# Patient Record
Sex: MALE | Race: WHITE | Employment: FULL TIME | URBAN - NONMETROPOLITAN AREA
[De-identification: names, ages, dates, MRNs, and addresses within clinical notes are randomized per-mention and may not be internally consistent; named-entity substitution may affect disease eponyms.]

---

## 2017-02-13 ENCOUNTER — OFFICE VISIT (OUTPATIENT)
Dept: PRIMARY CARE CLINIC | Age: 32
End: 2017-02-13
Payer: MEDICAID

## 2017-02-13 VITALS
OXYGEN SATURATION: 99 % | SYSTOLIC BLOOD PRESSURE: 118 MMHG | DIASTOLIC BLOOD PRESSURE: 70 MMHG | HEART RATE: 85 BPM | WEIGHT: 149 LBS | BODY MASS INDEX: 22.58 KG/M2 | RESPIRATION RATE: 18 BRPM | HEIGHT: 68 IN

## 2017-02-13 DIAGNOSIS — L91.8 SKIN TAG: ICD-10-CM

## 2017-02-13 DIAGNOSIS — R23.8 SKIN IRRITATION: ICD-10-CM

## 2017-02-13 DIAGNOSIS — K64.4 INTERNAL AND EXTERNAL BLEEDING HEMORRHOIDS: ICD-10-CM

## 2017-02-13 DIAGNOSIS — Z72.0 TOBACCO ABUSE: ICD-10-CM

## 2017-02-13 DIAGNOSIS — F90.2 ATTENTION DEFICIT HYPERACTIVITY DISORDER (ADHD), COMBINED TYPE: Primary | ICD-10-CM

## 2017-02-13 DIAGNOSIS — K64.8 INTERNAL AND EXTERNAL BLEEDING HEMORRHOIDS: ICD-10-CM

## 2017-02-13 LAB
AMPHETAMINE SCREEN, URINE: NORMAL
BARBITURATE SCREEN, URINE: NORMAL
BENZODIAZEPINE SCREEN, URINE: NORMAL
COCAINE METABOLITE SCREEN URINE: NORMAL
MDMA URINE: NORMAL
METHADONE SCREEN, URINE: NORMAL
METHAMPHETAMINE, URINE: NORMAL
OPIATE SCREEN URINE: NORMAL
OXYCODONE SCREEN URINE: NORMAL
PHENCYCLIDINE SCREEN URINE: NORMAL
PROPOXYPHENE SCREEN, URINE: NORMAL
THC: NORMAL
TRICYCLIC ANTIDEPRESSANTS, UR: NORMAL

## 2017-02-13 PROCEDURE — 99406 BEHAV CHNG SMOKING 3-10 MIN: CPT | Performed by: FAMILY MEDICINE

## 2017-02-13 PROCEDURE — 80305 DRUG TEST PRSMV DIR OPT OBS: CPT | Performed by: FAMILY MEDICINE

## 2017-02-13 PROCEDURE — 11200 RMVL SKIN TAGS UP TO&INC 15: CPT | Performed by: FAMILY MEDICINE

## 2017-02-13 PROCEDURE — 99213 OFFICE O/P EST LOW 20 MIN: CPT | Performed by: FAMILY MEDICINE

## 2017-02-13 RX ORDER — FEXOFENADINE HCL 180 MG/1
180 TABLET ORAL DAILY
Qty: 30 TABLET | Refills: 5 | Status: SHIPPED | OUTPATIENT
Start: 2017-02-13 | End: 2017-03-16 | Stop reason: SDUPTHER

## 2017-02-13 ASSESSMENT — ENCOUNTER SYMPTOMS
WHEEZING: 0
DIARRHEA: 0
CONSTIPATION: 0
SHORTNESS OF BREATH: 0
ABDOMINAL PAIN: 0
NAUSEA: 0
CHEST TIGHTNESS: 0
COUGH: 0
VOMITING: 0

## 2017-03-16 RX ORDER — FEXOFENADINE HCL 180 MG/1
180 TABLET ORAL DAILY
Qty: 30 TABLET | Refills: 11 | Status: SHIPPED | OUTPATIENT
Start: 2017-03-16 | End: 2018-04-12 | Stop reason: SDUPTHER

## 2017-03-16 RX ORDER — NAPROXEN 500 MG/1
500 TABLET ORAL 2 TIMES DAILY WITH MEALS
Qty: 60 TABLET | Refills: 11 | Status: SHIPPED | OUTPATIENT
Start: 2017-03-16 | End: 2018-05-08 | Stop reason: SDUPTHER

## 2017-03-16 RX ORDER — FLUTICASONE PROPIONATE 50 MCG
1 SPRAY, SUSPENSION (ML) NASAL DAILY
Qty: 1 BOTTLE | Refills: 11 | Status: SHIPPED | OUTPATIENT
Start: 2017-03-16 | End: 2018-05-08 | Stop reason: SDUPTHER

## 2017-05-15 ENCOUNTER — OFFICE VISIT (OUTPATIENT)
Dept: PRIMARY CARE CLINIC | Age: 32
End: 2017-05-15
Payer: MEDICAID

## 2017-05-15 VITALS
WEIGHT: 151 LBS | DIASTOLIC BLOOD PRESSURE: 80 MMHG | BODY MASS INDEX: 22.88 KG/M2 | TEMPERATURE: 97.2 F | OXYGEN SATURATION: 99 % | RESPIRATION RATE: 18 BRPM | HEIGHT: 68 IN | HEART RATE: 88 BPM | SYSTOLIC BLOOD PRESSURE: 122 MMHG

## 2017-05-15 DIAGNOSIS — Z72.0 TOBACCO ABUSE: ICD-10-CM

## 2017-05-15 DIAGNOSIS — F98.8 ATTENTION DEFICIT DISORDER: Primary | ICD-10-CM

## 2017-05-15 PROBLEM — F90.2 ATTENTION DEFICIT HYPERACTIVITY DISORDER (ADHD), COMBINED TYPE: Status: RESOLVED | Noted: 2017-02-13 | Resolved: 2017-05-15

## 2017-05-15 LAB
AMPHETAMINE SCREEN, URINE: POSITIVE
BARBITURATE SCREEN, URINE: NEGATIVE
BENZODIAZEPINE SCREEN, URINE: NEGATIVE
COCAINE METABOLITE SCREEN URINE: NEGATIVE
MDMA URINE: NEGATIVE
METHADONE SCREEN, URINE: NEGATIVE
METHAMPHETAMINE, URINE: NEGATIVE
OPIATE SCREEN URINE: NEGATIVE
OXYCODONE SCREEN URINE: NEGATIVE
PHENCYCLIDINE SCREEN URINE: NEGATIVE
PROPOXYPHENE SCREEN, URINE: NEGATIVE
THC: NEGATIVE
TRICYCLIC ANTIDEPRESSANTS, UR: NEGATIVE

## 2017-05-15 PROCEDURE — 99213 OFFICE O/P EST LOW 20 MIN: CPT | Performed by: FAMILY MEDICINE

## 2017-05-15 PROCEDURE — 80305 DRUG TEST PRSMV DIR OPT OBS: CPT | Performed by: FAMILY MEDICINE

## 2017-05-15 PROCEDURE — 99407 BEHAV CHNG SMOKING > 10 MIN: CPT | Performed by: FAMILY MEDICINE

## 2017-05-15 RX ORDER — NICOTINE 21 MG/24HR
1 PATCH, TRANSDERMAL 24 HOURS TRANSDERMAL EVERY 24 HOURS
Qty: 30 PATCH | Refills: 1 | Status: SHIPPED | OUTPATIENT
Start: 2017-05-15 | End: 2022-09-14

## 2017-05-15 RX ORDER — BUPROPION HYDROCHLORIDE 100 MG/1
100 TABLET, EXTENDED RELEASE ORAL 2 TIMES DAILY
Qty: 60 TABLET | Refills: 3 | Status: SHIPPED | OUTPATIENT
Start: 2017-05-15 | End: 2017-09-26

## 2017-05-18 ASSESSMENT — ENCOUNTER SYMPTOMS
DIARRHEA: 0
NAUSEA: 0
CHEST TIGHTNESS: 0
WHEEZING: 0
VOMITING: 0
COUGH: 0
CONSTIPATION: 0
SHORTNESS OF BREATH: 0
ABDOMINAL PAIN: 0

## 2017-08-15 ENCOUNTER — OFFICE VISIT (OUTPATIENT)
Dept: PRIMARY CARE CLINIC | Age: 32
End: 2017-08-15
Payer: MEDICAID

## 2017-08-15 VITALS
DIASTOLIC BLOOD PRESSURE: 84 MMHG | HEIGHT: 68 IN | WEIGHT: 153.8 LBS | SYSTOLIC BLOOD PRESSURE: 122 MMHG | OXYGEN SATURATION: 98 % | BODY MASS INDEX: 23.31 KG/M2 | TEMPERATURE: 97.8 F | HEART RATE: 89 BPM

## 2017-08-15 DIAGNOSIS — Z72.0 TOBACCO ABUSE: ICD-10-CM

## 2017-08-15 DIAGNOSIS — Z00.00 ROUTINE GENERAL MEDICAL EXAMINATION AT A HEALTH CARE FACILITY: Primary | ICD-10-CM

## 2017-08-15 DIAGNOSIS — F98.8 ATTENTION DEFICIT DISORDER: ICD-10-CM

## 2017-08-15 PROCEDURE — 99407 BEHAV CHNG SMOKING > 10 MIN: CPT | Performed by: FAMILY MEDICINE

## 2017-08-15 PROCEDURE — 99395 PREV VISIT EST AGE 18-39: CPT | Performed by: FAMILY MEDICINE

## 2017-08-15 RX ORDER — VARENICLINE TARTRATE 1 MG/1
1 TABLET, FILM COATED ORAL 2 TIMES DAILY
Qty: 60 TABLET | Refills: 3 | Status: SHIPPED | OUTPATIENT
Start: 2017-08-15 | End: 2017-09-26 | Stop reason: SDUPTHER

## 2017-08-15 RX ORDER — VARENICLINE TARTRATE 25 MG
KIT ORAL
Qty: 1 EACH | Refills: 0 | Status: SHIPPED | OUTPATIENT
Start: 2017-08-15 | End: 2018-12-07

## 2017-08-15 ASSESSMENT — ENCOUNTER SYMPTOMS
COUGH: 0
RHINORRHEA: 0
ABDOMINAL PAIN: 0
EYE ITCHING: 0
NAUSEA: 0
SORE THROAT: 0
COLOR CHANGE: 0
SHORTNESS OF BREATH: 0

## 2017-09-26 ENCOUNTER — OFFICE VISIT (OUTPATIENT)
Dept: PRIMARY CARE CLINIC | Age: 32
End: 2017-09-26
Payer: MEDICAID

## 2017-09-26 VITALS
HEART RATE: 61 BPM | HEIGHT: 68 IN | DIASTOLIC BLOOD PRESSURE: 72 MMHG | RESPIRATION RATE: 20 BRPM | WEIGHT: 150 LBS | SYSTOLIC BLOOD PRESSURE: 110 MMHG | BODY MASS INDEX: 22.73 KG/M2 | TEMPERATURE: 98 F | OXYGEN SATURATION: 98 %

## 2017-09-26 DIAGNOSIS — F98.8 ATTENTION DEFICIT DISORDER: Primary | Chronic | ICD-10-CM

## 2017-09-26 DIAGNOSIS — F43.9 STRESS AT HOME: ICD-10-CM

## 2017-09-26 DIAGNOSIS — Z72.0 TOBACCO ABUSE: Chronic | ICD-10-CM

## 2017-09-26 PROCEDURE — 99213 OFFICE O/P EST LOW 20 MIN: CPT | Performed by: FAMILY MEDICINE

## 2017-09-26 PROCEDURE — 99407 BEHAV CHNG SMOKING > 10 MIN: CPT | Performed by: FAMILY MEDICINE

## 2017-09-26 RX ORDER — VARENICLINE TARTRATE 1 MG/1
1 TABLET, FILM COATED ORAL 2 TIMES DAILY
Qty: 60 TABLET | Refills: 3 | Status: SHIPPED | OUTPATIENT
Start: 2017-09-26 | End: 2018-12-07

## 2017-09-26 ASSESSMENT — ENCOUNTER SYMPTOMS
VOMITING: 0
NAUSEA: 0
DIARRHEA: 0
COUGH: 0
CONSTIPATION: 0
CHEST TIGHTNESS: 0
WHEEZING: 0
SHORTNESS OF BREATH: 0
ABDOMINAL PAIN: 0

## 2017-11-22 NOTE — TELEPHONE ENCOUNTER
From: Andreina Saravia  Sent: 11/21/2017 11:20 PM CST  Subject: Medication Renewal Request    Andreina Saravia would like a refill of the following medications:  lisdexamfetamine (VYVANSE) 50 MG capsule Lois Alfaro MD]    Preferred pharmacy: [facility] [phone]    Comment:  I am taking my last dose of Vyvanse on Wednesday. So if you could please call in a refill so that I can  at [facility] tomorrow. Thanks.

## 2017-11-22 NOTE — TELEPHONE ENCOUNTER
PRITI was reviewed today per office protocol. Report shows No discrepancies. Fill pattern is consistent from single provider(s) at single pharmacy(s).     Presciption Escribed

## 2017-12-15 ENCOUNTER — OFFICE VISIT (OUTPATIENT)
Dept: PRIMARY CARE CLINIC | Age: 32
End: 2017-12-15
Payer: MEDICAID

## 2017-12-15 VITALS
OXYGEN SATURATION: 98 % | TEMPERATURE: 98 F | HEIGHT: 68 IN | BODY MASS INDEX: 21.67 KG/M2 | RESPIRATION RATE: 20 BRPM | SYSTOLIC BLOOD PRESSURE: 122 MMHG | WEIGHT: 143 LBS | HEART RATE: 81 BPM | DIASTOLIC BLOOD PRESSURE: 70 MMHG

## 2017-12-15 DIAGNOSIS — Z71.89 GRIEF COUNSELING: ICD-10-CM

## 2017-12-15 DIAGNOSIS — Z63.9 RELATIONSHIP PROBLEMS: ICD-10-CM

## 2017-12-15 DIAGNOSIS — F32.0 MILD SINGLE CURRENT EPISODE OF MAJOR DEPRESSIVE DISORDER (HCC): Primary | ICD-10-CM

## 2017-12-15 DIAGNOSIS — F98.8 ATTENTION DEFICIT DISORDER (ADD) WITHOUT HYPERACTIVITY: Chronic | ICD-10-CM

## 2017-12-15 PROCEDURE — G8427 DOCREV CUR MEDS BY ELIG CLIN: HCPCS | Performed by: FAMILY MEDICINE

## 2017-12-15 PROCEDURE — G8420 CALC BMI NORM PARAMETERS: HCPCS | Performed by: FAMILY MEDICINE

## 2017-12-15 PROCEDURE — G8484 FLU IMMUNIZE NO ADMIN: HCPCS | Performed by: FAMILY MEDICINE

## 2017-12-15 PROCEDURE — 99213 OFFICE O/P EST LOW 20 MIN: CPT | Performed by: FAMILY MEDICINE

## 2017-12-15 PROCEDURE — 4004F PT TOBACCO SCREEN RCVD TLK: CPT | Performed by: FAMILY MEDICINE

## 2017-12-15 RX ORDER — ESCITALOPRAM OXALATE 10 MG/1
10 TABLET ORAL DAILY
Qty: 30 TABLET | Refills: 3 | Status: SHIPPED | OUTPATIENT
Start: 2017-12-15 | End: 2018-01-19 | Stop reason: SDUPTHER

## 2017-12-15 SDOH — SOCIAL STABILITY - SOCIAL INSECURITY: PROBLEM RELATED TO PRIMARY SUPPORT GROUP, UNSPECIFIED: Z63.9

## 2017-12-15 ASSESSMENT — ENCOUNTER SYMPTOMS
COUGH: 0
SHORTNESS OF BREATH: 0
CHEST TIGHTNESS: 0
ABDOMINAL PAIN: 0
DIARRHEA: 0
WHEEZING: 0
CONSTIPATION: 0
NAUSEA: 0
VOMITING: 0

## 2017-12-15 NOTE — PROGRESS NOTES
no distension and no mass. There is no tenderness. There is no rebound and no guarding. Musculoskeletal:        Right lower leg: He exhibits no edema. Left lower leg: He exhibits no edema. Neurological: He is alert and oriented to person, place, and time. Coordination and gait normal.   Skin: Skin is warm and dry. He is not diaphoretic. No cyanosis. Nails show no clubbing. Psychiatric: His speech is normal and behavior is normal. Judgment normal. His mood appears not anxious. Cognition and memory are normal. He exhibits a depressed mood. He expresses no homicidal and no suicidal ideation. Nursing note and vitals reviewed. Assessment:    ICD-10-CM ICD-9-CM    1. Mild single current episode of major depressive disorder (HCC) F32.0 296.21    2. Relationship problems Z63.9 313.3    3. Attention deficit disorder (ADD) without hyperactivity F98.8 314.00        Plan:   I do believe he has underlying depression. I have recommended therapy individually and then to consider possible couples therapy. Approximately 20 minutes of time was taken in regards to counseling about major life decisions including possibly divorce, starting a new relationship, however handle upcoming holiday season with children, and we discussed him staying on his own versus with his new girlfriend until things settle down. Primary goals to help with any underlying depression further him to have time to process these events before he makes any decisions regarding his marriage versus the new relationship. Continue Vyvanse at this time. No orders of the defined types were placed in this encounter. No orders of the defined types were placed in this encounter. There are no discontinued medications. There are no Patient Instructions on file for this visit. Patient given educational handouts and has had all questions answered. Patient voices understanding and agrees to plans along with risks and benefits of plan.  Patient is instructed to continue prior meds, diet, and exercise plans unless instructed otherwise. Patient agrees to follow up as instructed and sooner if needed. Patient agrees to go to ER if condition becomes emergent. Notes may be completed with dictation device and spelling errors may occur. No Follow-up on file.

## 2018-01-19 RX ORDER — ESCITALOPRAM OXALATE 20 MG/1
20 TABLET ORAL DAILY
Qty: 30 TABLET | Refills: 2 | Status: SHIPPED | OUTPATIENT
Start: 2018-01-19 | End: 2018-04-02 | Stop reason: SDUPTHER

## 2018-01-22 ENCOUNTER — TELEPHONE (OUTPATIENT)
Dept: PRIMARY CARE CLINIC | Age: 33
End: 2018-01-22

## 2018-01-23 ENCOUNTER — OFFICE VISIT (OUTPATIENT)
Dept: PRIMARY CARE CLINIC | Age: 33
End: 2018-01-23
Payer: MEDICAID

## 2018-01-23 VITALS
HEART RATE: 88 BPM | TEMPERATURE: 98 F | HEIGHT: 68 IN | DIASTOLIC BLOOD PRESSURE: 82 MMHG | WEIGHT: 141 LBS | BODY MASS INDEX: 21.37 KG/M2 | OXYGEN SATURATION: 98 % | SYSTOLIC BLOOD PRESSURE: 136 MMHG | RESPIRATION RATE: 20 BRPM

## 2018-01-23 DIAGNOSIS — M75.21 BICEPS TENDINITIS OF RIGHT UPPER EXTREMITY: ICD-10-CM

## 2018-01-23 DIAGNOSIS — R11.0 NAUSEA: ICD-10-CM

## 2018-01-23 DIAGNOSIS — R42 DIZZINESS: ICD-10-CM

## 2018-01-23 DIAGNOSIS — F32.9 REACTIVE DEPRESSION: Primary | ICD-10-CM

## 2018-01-23 DIAGNOSIS — F98.8 ATTENTION DEFICIT DISORDER (ADD) WITHOUT HYPERACTIVITY: Chronic | ICD-10-CM

## 2018-01-23 PROCEDURE — 99214 OFFICE O/P EST MOD 30 MIN: CPT | Performed by: FAMILY MEDICINE

## 2018-01-23 PROCEDURE — G8484 FLU IMMUNIZE NO ADMIN: HCPCS | Performed by: FAMILY MEDICINE

## 2018-01-23 PROCEDURE — 4004F PT TOBACCO SCREEN RCVD TLK: CPT | Performed by: FAMILY MEDICINE

## 2018-01-23 PROCEDURE — G8420 CALC BMI NORM PARAMETERS: HCPCS | Performed by: FAMILY MEDICINE

## 2018-01-23 PROCEDURE — G8427 DOCREV CUR MEDS BY ELIG CLIN: HCPCS | Performed by: FAMILY MEDICINE

## 2018-01-23 RX ORDER — PREDNISONE 20 MG/1
TABLET ORAL
Qty: 20 TABLET | Refills: 0 | Status: SHIPPED | OUTPATIENT
Start: 2018-01-23 | End: 2018-12-07

## 2018-01-23 ASSESSMENT — ENCOUNTER SYMPTOMS
CHEST TIGHTNESS: 0
VOMITING: 0
DIARRHEA: 0
WHEEZING: 0
CONSTIPATION: 0
NAUSEA: 0
COUGH: 0
SHORTNESS OF BREATH: 0
ABDOMINAL PAIN: 0

## 2018-01-23 NOTE — PROGRESS NOTES
Bc Arias is a 33 y.o. male who presents today for   Chief Complaint   Patient presents with    Depression       HPI  Patient is here for f/u depression. He had an affair and is with that individual.  States going well. Going through divorce. He is expecting child. He is currently seeing Tena Gonzales. Counseling improving depressed feelings. He has increased lexapro to 20 mg daily. He has had some nausea and dizziness. It is not debilitating. Needs a refill on his ADD medications today. Also he notes he is having right elbow pain. It was worsened whenever he was working at [employer] in. He notes that whenever he would do a repetitive motion of the cause some increased pain. No change in PMH, family, social, or surgical history unless mentioned above. Review of Systems   Constitutional: Negative for chills and fever. Respiratory: Negative for cough, chest tightness, shortness of breath and wheezing. Cardiovascular: Negative for chest pain, palpitations and leg swelling. Gastrointestinal: Negative for abdominal pain, constipation, diarrhea, nausea and vomiting. Genitourinary: Negative for difficulty urinating, dysuria and frequency. Psychiatric/Behavioral: Positive for dysphoric mood. Negative for self-injury, sleep disturbance and suicidal ideas. The patient is not nervous/anxious. Past Medical History:   Diagnosis Date    Asthma     as a child    Enlarged prostate     Hemorrhoid     Kidney stones     Rectal bleed        Current Outpatient Prescriptions   Medication Sig Dispense Refill    lisdexamfetamine (VYVANSE) 50 MG capsule Take 1 capsule by mouth every morning for 30 days.  30 capsule 0    escitalopram (LEXAPRO) 20 MG tablet Take 1 tablet by mouth daily 30 tablet 2    varenicline (CHANTIX CONTINUING MONTH AARON) 1 MG tablet Take 1 tablet by mouth 2 times daily 60 tablet 3    varenicline (CHANTIX STARTING MONTH AARON) 0.5 MG X 11 & 1 MG X 42 tablet Take days.     Dispense:  30 capsule     Refill:  0     Medications Discontinued During This Encounter   Medication Reason    lisdexamfetamine (VYVANSE) 50 MG capsule Reorder     There are no Patient Instructions on file for this visit. Patient given educational handouts and has had all questions answered. Patient voices understanding and agrees to plans along with risks and benefits of plan. Patient is instructed to continue prior meds, diet, and exercise plans unless instructed otherwise. Patient agrees to follow up as instructed and sooner if needed. Patient agrees to go to ER if condition becomes emergent. Notes may be completed with dictation device and spelling errors may occur. No Follow-up on file.

## 2018-02-22 NOTE — TELEPHONE ENCOUNTER
Tarun Robinson called 02/22/18 with request for refill on Vyvanse 50 mg. Last office visit 01/23/18 with next scheduled appointment 04/23/18  Dose verified.    Last UDS  05/05/17  negative  Last fill per Dr Dev Allen

## 2018-04-02 RX ORDER — ESCITALOPRAM OXALATE 10 MG/1
10 TABLET ORAL DAILY
Qty: 30 TABLET | Refills: 5 | Status: SHIPPED | OUTPATIENT
Start: 2018-04-02 | End: 2019-01-14 | Stop reason: SDUPTHER

## 2018-04-12 RX ORDER — FEXOFENADINE HCL 180 MG/1
180 TABLET ORAL DAILY
Qty: 30 TABLET | Refills: 11 | Status: SHIPPED | OUTPATIENT
Start: 2018-04-12 | End: 2019-04-09 | Stop reason: SDUPTHER

## 2018-05-07 ENCOUNTER — PATIENT MESSAGE (OUTPATIENT)
Dept: PRIMARY CARE CLINIC | Age: 33
End: 2018-05-07

## 2018-05-08 RX ORDER — FLUTICASONE PROPIONATE 50 MCG
1 SPRAY, SUSPENSION (ML) NASAL DAILY
Qty: 1 BOTTLE | Refills: 11 | Status: SHIPPED | OUTPATIENT
Start: 2018-05-08 | End: 2021-03-21 | Stop reason: SDUPTHER

## 2018-05-08 RX ORDER — NAPROXEN 500 MG/1
500 TABLET ORAL 2 TIMES DAILY WITH MEALS
Qty: 60 TABLET | Refills: 11 | Status: SHIPPED | OUTPATIENT
Start: 2018-05-08

## 2018-06-05 ENCOUNTER — OFFICE VISIT (OUTPATIENT)
Dept: PRIMARY CARE CLINIC | Age: 33
End: 2018-06-05
Payer: MEDICAID

## 2018-06-05 VITALS
RESPIRATION RATE: 20 BRPM | WEIGHT: 143 LBS | HEART RATE: 79 BPM | SYSTOLIC BLOOD PRESSURE: 102 MMHG | DIASTOLIC BLOOD PRESSURE: 64 MMHG | OXYGEN SATURATION: 94 % | TEMPERATURE: 98 F | BODY MASS INDEX: 21.67 KG/M2 | HEIGHT: 68 IN

## 2018-06-05 DIAGNOSIS — F32.4 MAJOR DEPRESSIVE DISORDER WITH SINGLE EPISODE, IN PARTIAL REMISSION (HCC): Chronic | ICD-10-CM

## 2018-06-05 DIAGNOSIS — Z23 NEED FOR PROPHYLACTIC VACCINATION AGAINST STREPTOCOCCUS PNEUMONIAE (PNEUMOCOCCUS): ICD-10-CM

## 2018-06-05 DIAGNOSIS — F98.8 ATTENTION DEFICIT DISORDER (ADD) WITHOUT HYPERACTIVITY: Primary | Chronic | ICD-10-CM

## 2018-06-05 DIAGNOSIS — Z72.0 TOBACCO ABUSE: Chronic | ICD-10-CM

## 2018-06-05 PROBLEM — F32.9 REACTIVE DEPRESSION: Chronic | Status: RESOLVED | Noted: 2018-01-23 | Resolved: 2018-06-05

## 2018-06-05 PROCEDURE — 90471 IMMUNIZATION ADMIN: CPT | Performed by: FAMILY MEDICINE

## 2018-06-05 PROCEDURE — G8420 CALC BMI NORM PARAMETERS: HCPCS | Performed by: FAMILY MEDICINE

## 2018-06-05 PROCEDURE — G8427 DOCREV CUR MEDS BY ELIG CLIN: HCPCS | Performed by: FAMILY MEDICINE

## 2018-06-05 PROCEDURE — 90732 PPSV23 VACC 2 YRS+ SUBQ/IM: CPT | Performed by: FAMILY MEDICINE

## 2018-06-05 PROCEDURE — 4004F PT TOBACCO SCREEN RCVD TLK: CPT | Performed by: FAMILY MEDICINE

## 2018-06-05 PROCEDURE — 99406 BEHAV CHNG SMOKING 3-10 MIN: CPT | Performed by: FAMILY MEDICINE

## 2018-06-05 PROCEDURE — 99213 OFFICE O/P EST LOW 20 MIN: CPT | Performed by: FAMILY MEDICINE

## 2018-06-05 ASSESSMENT — ENCOUNTER SYMPTOMS
CHEST TIGHTNESS: 0
DIARRHEA: 0
SHORTNESS OF BREATH: 0
CONSTIPATION: 0
ABDOMINAL PAIN: 0
NAUSEA: 0
COUGH: 0
WHEEZING: 0
VOMITING: 0

## 2018-06-05 ASSESSMENT — PATIENT HEALTH QUESTIONNAIRE - PHQ9
2. FEELING DOWN, DEPRESSED OR HOPELESS: 1
SUM OF ALL RESPONSES TO PHQ QUESTIONS 1-9: 2
1. LITTLE INTEREST OR PLEASURE IN DOING THINGS: 1
SUM OF ALL RESPONSES TO PHQ9 QUESTIONS 1 & 2: 2

## 2018-07-17 DIAGNOSIS — F98.8 ATTENTION DEFICIT DISORDER (ADD) WITHOUT HYPERACTIVITY: Chronic | ICD-10-CM

## 2018-08-20 DIAGNOSIS — F98.8 ATTENTION DEFICIT DISORDER (ADD) WITHOUT HYPERACTIVITY: Chronic | ICD-10-CM

## 2018-08-21 ENCOUNTER — PATIENT MESSAGE (OUTPATIENT)
Dept: PRIMARY CARE CLINIC | Age: 33
End: 2018-08-21

## 2018-08-22 ENCOUNTER — PATIENT MESSAGE (OUTPATIENT)
Dept: PRIMARY CARE CLINIC | Age: 33
End: 2018-08-22

## 2018-08-22 NOTE — TELEPHONE ENCOUNTER
From: Kathya Lockett  To: Nichole Goodwin MD  Sent: 8/21/2018 10:23 PM CDT  Subject: Prescription Question    I sent in for a refill on my Vyvanse Monday afternoon through [address] but haven't gotten a follow up from your office. I would just like to check to make sure that my request is being processed.

## 2018-09-07 ENCOUNTER — OFFICE VISIT (OUTPATIENT)
Dept: PRIMARY CARE CLINIC | Age: 33
End: 2018-09-07
Payer: MEDICAID

## 2018-09-07 VITALS
SYSTOLIC BLOOD PRESSURE: 136 MMHG | TEMPERATURE: 98 F | BODY MASS INDEX: 21.82 KG/M2 | DIASTOLIC BLOOD PRESSURE: 78 MMHG | RESPIRATION RATE: 20 BRPM | HEIGHT: 68 IN | WEIGHT: 144 LBS

## 2018-09-07 DIAGNOSIS — F98.8 ATTENTION DEFICIT DISORDER (ADD) WITHOUT HYPERACTIVITY: Primary | Chronic | ICD-10-CM

## 2018-09-07 DIAGNOSIS — F32.4 MAJOR DEPRESSIVE DISORDER WITH SINGLE EPISODE, IN PARTIAL REMISSION (HCC): Chronic | ICD-10-CM

## 2018-09-07 LAB
AMPHETAMINE SCREEN, URINE: POSITIVE
BARBITURATE SCREEN, URINE: NORMAL
BENZODIAZEPINE SCREEN, URINE: NORMAL
BUPRENORPHINE URINE: NORMAL
COCAINE METABOLITE SCREEN URINE: NORMAL
GABAPENTIN SCREEN, URINE: NORMAL
METHADONE SCREEN, URINE: NORMAL
METHAMPHETAMINE, URINE: NORMAL
OPIATE SCREEN URINE: NORMAL
OXYCODONE SCREEN URINE: NORMAL
PHENCYCLIDINE SCREEN URINE: NORMAL
PROPOXYPHENE SCREEN, URINE: NORMAL
THC SCREEN, URINE: NORMAL
TRICYCLIC ANTIDEPRESSANTS, UR: NORMAL

## 2018-09-07 PROCEDURE — G8427 DOCREV CUR MEDS BY ELIG CLIN: HCPCS | Performed by: FAMILY MEDICINE

## 2018-09-07 PROCEDURE — 4004F PT TOBACCO SCREEN RCVD TLK: CPT | Performed by: FAMILY MEDICINE

## 2018-09-07 PROCEDURE — 99213 OFFICE O/P EST LOW 20 MIN: CPT | Performed by: FAMILY MEDICINE

## 2018-09-07 PROCEDURE — G8420 CALC BMI NORM PARAMETERS: HCPCS | Performed by: FAMILY MEDICINE

## 2018-09-07 PROCEDURE — 80305 DRUG TEST PRSMV DIR OPT OBS: CPT | Performed by: FAMILY MEDICINE

## 2018-09-07 ASSESSMENT — ENCOUNTER SYMPTOMS
WHEEZING: 0
CONSTIPATION: 0
COUGH: 0
CHEST TIGHTNESS: 0
SHORTNESS OF BREATH: 0
VOMITING: 0
DIARRHEA: 0
ABDOMINAL PAIN: 0
NAUSEA: 0

## 2018-09-07 NOTE — PROGRESS NOTES
then 2 tabs for 2 days, then 1 tab for 2 days. 20 tablet 0    Compression Sleeves R elbow compression sleeve  Dx: Tendonitis, right upper extremity 1 each 0    varenicline (CHANTIX CONTINUING MONTH PAK) 1 MG tablet Take 1 tablet by mouth 2 times daily 60 tablet 3    varenicline (CHANTIX STARTING MONTH PAK) 0.5 MG X 11 & 1 MG X 42 tablet Take by mouth. 1 each 0    Multiple Vitamin (MULTI VITAMIN DAILY) TABS Take 1 tablet by mouth daily 30 tablet 5    nicotine (NICODERM CQ) 21 MG/24HR Place 1 patch onto the skin every 24 hours 30 patch 1     No current facility-administered medications for this visit. No Known Allergies    Past Surgical History:   Procedure Laterality Date    COLONOSCOPY N/A 5/25/2016    Dr Naveed Lemus internal hemorrhoids, HP x 3    MOUTH SURGERY      Tooth extraction    WISDOM TOOTH EXTRACTION         Social History   Substance Use Topics    Smoking status: Current Every Day Smoker     Packs/day: 1.00    Smokeless tobacco: Never Used    Alcohol use 1.8 - 2.4 oz/week     3 - 4 Cans of beer per week      Comment: occassionally       Family History   Problem Relation Age of Onset    Breast Cancer Mother     Colon Polyps Father     Colon Cancer Paternal Grandmother     Colon Polyps Paternal Grandmother     Esophageal Cancer Neg Hx     Liver Cancer Neg Hx     Liver Disease Neg Hx     Stomach Cancer Neg Hx     Rectal Cancer Neg Hx        /78   Temp 98 °F (36.7 °C)   Resp 20   Ht 5' 8\" (1.727 m)   Wt 144 lb (65.3 kg)   BMI 21.90 kg/m²     Physical Exam   Constitutional: He is oriented to person, place, and time. He appears well-developed and well-nourished. Non-toxic appearance. No distress. HENT:   Head: Normocephalic and atraumatic. Cardiovascular: Normal rate, regular rhythm, normal heart sounds and intact distal pulses. Exam reveals no gallop and no friction rub. No murmur heard.   Pulmonary/Chest: Effort normal and breath sounds normal. No respiratory distress. He has no wheezes. He has no rales. He exhibits no tenderness. Abdominal: Soft. Bowel sounds are normal. He exhibits no distension and no mass. There is no tenderness. There is no rebound and no guarding. Musculoskeletal:        Right lower leg: He exhibits no edema. Left lower leg: He exhibits no edema. Neurological: He is alert and oriented to person, place, and time. Coordination and gait normal.   Skin: Skin is warm and dry. He is not diaphoretic. No cyanosis. Nails show no clubbing. Psychiatric: His speech is normal and behavior is normal. Judgment normal. His mood appears not anxious. Cognition and memory are normal. He does not exhibit a depressed mood. He expresses no homicidal and no suicidal ideation. Nursing note and vitals reviewed. Assessment:    ICD-10-CM ICD-9-CM    1. Attention deficit disorder (ADD) without hyperactivity F98.8 314.00    2. Major depressive disorder with single episode, in partial [facility] ([facility] F32.4 296.25        Plan:   ADD and depression are both improving. We will proceed with urine drug screen today. Report filled out today. No orders of the defined types were placed in this encounter. No orders of the defined types were placed in this encounter. There are no discontinued medications. There are no Patient Instructions on file for this visit. Patient given educational handouts and has had all questions answered. Patient voices understanding and agrees to plans along with risks and benefits of plan. Patient is instructed to continue prior meds, diet, and exercise plans unless instructed otherwise. Patient agrees to follow up as instructed and sooner if needed. Patient agrees to go to ER if condition becomes emergent. Notes may be completed with dictation device and spelling errors may occur. No Follow-up on file.

## 2018-09-27 DIAGNOSIS — F98.8 ATTENTION DEFICIT DISORDER (ADD) WITHOUT HYPERACTIVITY: Chronic | ICD-10-CM

## 2018-10-22 DIAGNOSIS — F98.8 ATTENTION DEFICIT DISORDER (ADD) WITHOUT HYPERACTIVITY: Chronic | ICD-10-CM

## 2018-10-30 ENCOUNTER — PATIENT MESSAGE (OUTPATIENT)
Dept: PRIMARY CARE CLINIC | Age: 33
End: 2018-10-30

## 2018-12-07 ENCOUNTER — OFFICE VISIT (OUTPATIENT)
Dept: PRIMARY CARE CLINIC | Age: 33
End: 2018-12-07

## 2018-12-07 VITALS
HEIGHT: 68 IN | HEART RATE: 79 BPM | WEIGHT: 144 LBS | DIASTOLIC BLOOD PRESSURE: 72 MMHG | SYSTOLIC BLOOD PRESSURE: 110 MMHG | OXYGEN SATURATION: 99 % | TEMPERATURE: 98 F | BODY MASS INDEX: 21.82 KG/M2 | RESPIRATION RATE: 18 BRPM

## 2018-12-07 DIAGNOSIS — F98.8 ATTENTION DEFICIT DISORDER (ADD) WITHOUT HYPERACTIVITY: Chronic | ICD-10-CM

## 2018-12-07 DIAGNOSIS — Z00.00 ROUTINE GENERAL MEDICAL EXAMINATION AT A HEALTH CARE FACILITY: Primary | ICD-10-CM

## 2018-12-07 DIAGNOSIS — F17.201 NICOTINE DEPENDENCE IN REMISSION, UNSPECIFIED NICOTINE PRODUCT TYPE: ICD-10-CM

## 2018-12-07 PROCEDURE — 99395 PREV VISIT EST AGE 18-39: CPT | Performed by: FAMILY MEDICINE

## 2018-12-07 PROCEDURE — G8484 FLU IMMUNIZE NO ADMIN: HCPCS | Performed by: FAMILY MEDICINE

## 2018-12-07 PROCEDURE — 99406 BEHAV CHNG SMOKING 3-10 MIN: CPT | Performed by: FAMILY MEDICINE

## 2018-12-07 ASSESSMENT — ENCOUNTER SYMPTOMS
NAUSEA: 0
WHEEZING: 0
SHORTNESS OF BREATH: 0
RHINORRHEA: 0
CONSTIPATION: 0
COLOR CHANGE: 0
SORE THROAT: 0
VOMITING: 0
ABDOMINAL PAIN: 0
COUGH: 0
DIARRHEA: 0
EYE ITCHING: 0
CHEST TIGHTNESS: 0

## 2018-12-07 NOTE — PATIENT INSTRUCTIONS
Stay on current medications. Stop cigarrettes and do e cig only    Please arrive 15 minutes early to next follow up appointment in 4 months or schedule an appointment sooner if needed.

## 2018-12-07 NOTE — PROGRESS NOTES
Bottle 11    fexofenadine (ALLEGRA) 180 MG tablet TAKE 1 TABLET BY MOUTH DAILY 30 tablet 11    escitalopram (LEXAPRO) 10 MG tablet Take 1 tablet by mouth daily 30 tablet 5    Compression Sleeves R elbow compression sleeve  Dx: Tendonitis, right upper extremity 1 each 0    Multiple Vitamin (MULTI VITAMIN DAILY) TABS Take 1 tablet by mouth daily 30 tablet 5    nicotine (NICODERM CQ) 21 MG/24HR Place 1 patch onto the skin every 24 hours 30 patch 1     No current facility-administered medications for this visit. No Known Allergies    Past Surgical History:   Procedure Laterality Date    COLONOSCOPY N/A 5/25/2016    Dr Ana María Moreland internal hemorrhoids, HP x 3    MOUTH SURGERY      Tooth extraction    WISDOM TOOTH EXTRACTION         Social History   Substance Use Topics    Smoking status: Current Every Day Smoker     Packs/day: 1.00    Smokeless tobacco: Never Used    Alcohol use 1.8 - 2.4 oz/week     3 - 4 Cans of beer per week      Comment: occassionally       Family History   Problem Relation Age of Onset    Breast Cancer Mother     Colon Polyps Father     Colon Cancer Paternal Grandmother     Colon Polyps Paternal Grandmother     Esophageal Cancer Neg Hx     Liver Cancer Neg Hx     Liver Disease Neg Hx     Stomach Cancer Neg Hx     Rectal Cancer Neg Hx        /72   Pulse 79   Temp 98 °F (36.7 °C) (Temporal)   Resp 18   Ht 5' 8\" (1.727 m)   Wt 144 lb (65.3 kg)   SpO2 99%   BMI 21.90 kg/m²     Physical Exam   Constitutional: He is oriented to person, place, and time. He appears well-developed and well-nourished. Non-toxic appearance. No distress. HENT:   Head: Normocephalic and atraumatic. Not macrocephalic and not microcephalic. Right Ear: External ear normal. No drainage. No decreased hearing is noted. Left Ear: External ear normal. No drainage. No decreased hearing is noted. Nose: Nose normal. No rhinorrhea or nasal deformity.    Mouth/Throat: Uvula is midline, oropharynx is clear and moist and mucous membranes are normal. Mucous membranes are not pale and not dry. Eyes: Pupils are equal, round, and reactive to light. Conjunctivae, EOM and lids are normal. Right eye exhibits no discharge. Left eye exhibits no discharge. No scleral icterus. Neck: Normal range of motion and phonation normal. Neck supple. No tracheal deviation present. No thyromegaly present. Cardiovascular: Normal rate, regular rhythm, S1 normal, S2 normal and normal heart sounds. No extrasystoles are present. No murmur heard. Pulmonary/Chest: Effort normal and breath sounds normal. No respiratory distress. He has no wheezes. He has no rhonchi. He has no rales. Abdominal: Soft. Bowel sounds are normal. He exhibits no distension. There is no tenderness. There is no guarding. Musculoskeletal: Normal range of motion. He exhibits no edema or tenderness. Cervical back: Normal. He exhibits no tenderness and no bony tenderness. Thoracic back: Normal. He exhibits no tenderness and no bony tenderness. Lumbar back: Normal. He exhibits no tenderness and no bony tenderness. Right lower leg: He exhibits no swelling and no edema. Left lower leg: He exhibits no swelling and no edema. Lymphadenopathy:        Head (right side): No submental, no submandibular and no tonsillar adenopathy present. Head (left side): No submental, no submandibular and no tonsillar adenopathy present. He has no cervical adenopathy. Right: No supraclavicular adenopathy present. Left: No supraclavicular adenopathy present. Neurological: He is alert and oriented to person, place, and time. He has normal reflexes. He displays no tremor. He displays no seizure activity. Gait normal.   Skin: Skin is dry. No rash (on extremities, head, neck, face) noted. He is not diaphoretic. No cyanosis or erythema (on head, neck, face, extremities). No pallor. Nails show no clubbing. up appointment in 4 months or schedule an appointment sooner if needed. Patient given educational handouts and has had all questions answered. Patient voices understanding and agrees to plans along with risks and benefits of plan. Patient isinstructed to continue prior meds, diet, and exercise plans unless instructed otherwise. Patient agrees to follow up as instructed and sooner if needed. Patient agrees to go to ER if condition becomes emergent. Notesmay be completed with dictation device and spelling errors may occur. Return in about 4 months (around 4/7/2019) for ADD.

## 2019-01-14 RX ORDER — ESCITALOPRAM OXALATE 10 MG/1
10 TABLET ORAL DAILY
Qty: 30 TABLET | Refills: 5 | Status: SHIPPED | OUTPATIENT
Start: 2019-01-14 | End: 2019-04-09 | Stop reason: SDUPTHER

## 2019-02-13 DIAGNOSIS — F98.8 ATTENTION DEFICIT DISORDER (ADD) WITHOUT HYPERACTIVITY: Chronic | ICD-10-CM

## 2019-04-09 ENCOUNTER — OFFICE VISIT (OUTPATIENT)
Dept: PRIMARY CARE CLINIC | Age: 34
End: 2019-04-09
Payer: COMMERCIAL

## 2019-04-09 VITALS
OXYGEN SATURATION: 99 % | HEART RATE: 82 BPM | BODY MASS INDEX: 21.25 KG/M2 | WEIGHT: 140.2 LBS | DIASTOLIC BLOOD PRESSURE: 74 MMHG | HEIGHT: 68 IN | TEMPERATURE: 98.2 F | SYSTOLIC BLOOD PRESSURE: 112 MMHG | RESPIRATION RATE: 12 BRPM

## 2019-04-09 DIAGNOSIS — F98.8 ATTENTION DEFICIT DISORDER (ADD) WITHOUT HYPERACTIVITY: Primary | Chronic | ICD-10-CM

## 2019-04-09 DIAGNOSIS — B96.89 ACUTE BACTERIAL SINUSITIS: ICD-10-CM

## 2019-04-09 DIAGNOSIS — F32.9 REACTIVE DEPRESSION: ICD-10-CM

## 2019-04-09 DIAGNOSIS — F98.8 ATTENTION DEFICIT DISORDER (ADD) WITHOUT HYPERACTIVITY: Chronic | ICD-10-CM

## 2019-04-09 DIAGNOSIS — Z79.899 DRUG THERAPY: ICD-10-CM

## 2019-04-09 DIAGNOSIS — J01.90 ACUTE BACTERIAL SINUSITIS: ICD-10-CM

## 2019-04-09 LAB
ALBUMIN SERPL-MCNC: 4.7 G/DL (ref 3.5–5.2)
ALP BLD-CCNC: 81 U/L (ref 40–130)
ALT SERPL-CCNC: 19 U/L (ref 5–41)
AMPHETAMINE SCREEN, URINE: NORMAL
ANION GAP SERPL CALCULATED.3IONS-SCNC: 11 MMOL/L (ref 7–19)
AST SERPL-CCNC: 19 U/L (ref 5–40)
BARBITURATE SCREEN, URINE: NORMAL
BENZODIAZEPINE SCREEN, URINE: NORMAL
BILIRUB SERPL-MCNC: 0.6 MG/DL (ref 0.2–1.2)
BUN BLDV-MCNC: 16 MG/DL (ref 6–20)
BUPRENORPHINE URINE: NORMAL
CALCIUM SERPL-MCNC: 9.6 MG/DL (ref 8.6–10)
CHLORIDE BLD-SCNC: 104 MMOL/L (ref 98–111)
CO2: 28 MMOL/L (ref 22–29)
COCAINE METABOLITE SCREEN URINE: NORMAL
CREAT SERPL-MCNC: 0.8 MG/DL (ref 0.5–1.2)
GABAPENTIN SCREEN, URINE: NORMAL
GFR NON-AFRICAN AMERICAN: >60
GLUCOSE BLD-MCNC: 65 MG/DL (ref 74–109)
MDMA URINE: NORMAL
METHADONE SCREEN, URINE: NORMAL
METHAMPHETAMINE, URINE: NORMAL
OPIATE SCREEN URINE: NORMAL
OXYCODONE SCREEN URINE: NORMAL
PHENCYCLIDINE SCREEN URINE: NORMAL
POTASSIUM SERPL-SCNC: 3.4 MMOL/L (ref 3.5–5)
PROPOXYPHENE SCREEN, URINE: NORMAL
SODIUM BLD-SCNC: 143 MMOL/L (ref 136–145)
THC SCREEN, URINE: NORMAL
TOTAL PROTEIN: 7.9 G/DL (ref 6.6–8.7)
TRICYCLIC ANTIDEPRESSANTS, UR: NORMAL

## 2019-04-09 PROCEDURE — 99214 OFFICE O/P EST MOD 30 MIN: CPT | Performed by: FAMILY MEDICINE

## 2019-04-09 PROCEDURE — 80305 DRUG TEST PRSMV DIR OPT OBS: CPT | Performed by: FAMILY MEDICINE

## 2019-04-09 RX ORDER — AMOXICILLIN AND CLAVULANATE POTASSIUM 875; 125 MG/1; MG/1
1 TABLET, FILM COATED ORAL 2 TIMES DAILY
Qty: 20 TABLET | Refills: 0 | Status: SHIPPED | OUTPATIENT
Start: 2019-04-09 | End: 2019-04-19

## 2019-04-09 RX ORDER — ESCITALOPRAM OXALATE 10 MG/1
10 TABLET ORAL DAILY
Qty: 90 TABLET | Refills: 2 | Status: SHIPPED | OUTPATIENT
Start: 2019-04-09 | End: 2019-08-12

## 2019-04-09 RX ORDER — FEXOFENADINE HCL 180 MG/1
180 TABLET ORAL DAILY
Qty: 90 TABLET | Refills: 3 | Status: SHIPPED | OUTPATIENT
Start: 2019-04-09 | End: 2021-03-21 | Stop reason: SDUPTHER

## 2019-04-09 NOTE — PATIENT INSTRUCTIONS
Stay on current medications. Please arrive 15 minutes early to next follow up appointment in 4 months or schedule an appointment sooner if needed. Get your labs checked in [address].

## 2019-04-12 ASSESSMENT — ENCOUNTER SYMPTOMS
WHEEZING: 0
SORE THROAT: 1
DIARRHEA: 0
NAUSEA: 0
VOMITING: 0
COUGH: 1
SHORTNESS OF BREATH: 0
ABDOMINAL PAIN: 0
CONSTIPATION: 0
RHINORRHEA: 1
CHEST TIGHTNESS: 0

## 2019-04-12 NOTE — PROGRESS NOTES
Jose Pedroza is a 36 y.o. male who presents today for   Chief Complaint   Patient presents with    ADD     4 month FU    Sinus Problem     wanting Abx for sinus infection       HPI  Patient is here for follow-up for ADD. He has been doing well on medication. He has been up-to-date on urine drug screens and compliant. He denies any side effects or any palpitations. He does have a history reactive depression and that is doing better overall. He does his also having issues with the sinuses today he states. This is ongoing for approximate 5 days and worsening and starting to have headaches from it. No change in PMH, family, social, or surgical history unless mentioned above. Review of Systems   Constitutional: Positive for fatigue. Negative for chills and fever. HENT: Positive for congestion, rhinorrhea, sneezing and sore throat. Respiratory: Positive for cough. Negative for chest tightness, shortness of breath and wheezing. Cardiovascular: Negative for chest pain, palpitations and leg swelling. Gastrointestinal: Negative for abdominal pain, constipation, diarrhea, nausea and vomiting. Genitourinary: Negative for difficulty urinating, dysuria and frequency. Psychiatric/Behavioral: Negative for dysphoric mood, self-injury, sleep disturbance and suicidal ideas. The patient is not nervous/anxious. Past Medical History:   Diagnosis Date    Asthma     as a child    Enlarged prostate     Hemorrhoid     Kidney stones     Rectal bleed        Current Outpatient Medications   Medication Sig Dispense Refill    fexofenadine (ALLEGRA) 180 MG tablet Take 1 tablet by mouth daily 90 tablet 3    [START ON 5/2/2019] lisdexamfetamine (VYVANSE) 50 MG capsule Take 1 capsule by mouth every morning for 30 days.  30 capsule 0    amoxicillin-clavulanate (AUGMENTIN) 875-125 MG per tablet Take 1 tablet by mouth 2 times daily for 10 days 20 tablet 0    escitalopram (LEXAPRO) 10 MG tablet Take 1 tablet by mouth daily 90 tablet 2    lisdexamfetamine (VYVANSE) 50 MG capsule Take 1 capsule by mouth every morning for 30 days. 30 capsule 0    naproxen (NAPROSYN) 500 MG tablet Take 1 tablet by mouth 2 times daily (with meals) 60 tablet 11    fluticasone (FLONASE) 50 MCG/ACT nasal spray 1 spray by Nasal route daily 1 Bottle 11    Multiple Vitamin (MULTI VITAMIN DAILY) TABS Take 1 tablet by mouth daily 30 tablet 5    Compression Sleeves R elbow compression sleeve  Dx: Tendonitis, right upper extremity 1 each 0    nicotine (NICODERM CQ) 21 MG/24HR Place 1 patch onto the skin every 24 hours 30 patch 1     No current facility-administered medications for this visit. No Known Allergies    Past Surgical History:   Procedure Laterality Date    COLONOSCOPY N/A 5/25/2016    Dr Beth Figueredo internal hemorrhoids, HP x 3    MOUTH SURGERY      Tooth extraction    WISDOM TOOTH EXTRACTION         Social History     Tobacco Use    Smoking status: Current Every Day Smoker     Packs/day: 0.50    Smokeless tobacco: Never Used   Substance Use Topics    Alcohol use: Yes     Alcohol/week: 1.8 - 2.4 oz     Types: 3 - 4 Cans of beer per week     Comment: occassionally    Drug use: No       Family History   Problem Relation Age of Onset    Breast Cancer Mother     Colon Polyps Father     Colon Cancer Paternal Grandmother     Colon Polyps Paternal Grandmother     Esophageal Cancer Neg Hx     Liver Cancer Neg Hx     Liver Disease Neg Hx     Stomach Cancer Neg Hx     Rectal Cancer Neg Hx        /74   Pulse 82   Temp 98.2 °F (36.8 °C) (Temporal)   Resp 12   Ht 5' 8\" (1.727 m)   Wt 140 lb 3.2 oz (63.6 kg)   SpO2 99%   BMI 21.32 kg/m²     Physical Exam   Constitutional: He is oriented to person, place, and time. He appears well-developed and well-nourished. Non-toxic appearance. No distress. HENT:   Head: Normocephalic and atraumatic.    Right Ear: Hearing, tympanic membrane, external ear and ear directed. I will prescribe Augmentin at this time she appears to have bacterial sinusitis and initiate antihistamine. Orders Placed This Encounter   Procedures    Comprehensive Metabolic Panel     Standing Status:   Future     Number of Occurrences:   1     Standing Expiration Date:   4/9/2020    POCT Rapid Drug Screen     Orders Placed This Encounter   Medications    fexofenadine (ALLEGRA) 180 MG tablet     Sig: Take 1 tablet by mouth daily     Dispense:  90 tablet     Refill:  3    lisdexamfetamine (VYVANSE) 50 MG capsule     Sig: Take 1 capsule by mouth every morning for 30 days. Dispense:  30 capsule     Refill:  0    amoxicillin-clavulanate (AUGMENTIN) 875-125 MG per tablet     Sig: Take 1 tablet by mouth 2 times daily for 10 days     Dispense:  20 tablet     Refill:  0     Pt will ask for it before getting it filled    escitalopram (LEXAPRO) 10 MG tablet     Sig: Take 1 tablet by mouth daily     Dispense:  90 tablet     Refill:  2        Medications Discontinued During This Encounter   Medication Reason    fexofenadine (ALLEGRA) 180 MG tablet REORDER    lisdexamfetamine (VYVANSE) 50 MG capsule REORDER    escitalopram (LEXAPRO) 10 MG tablet REORDER     Patient Instructions   Stay on current medications. Please arrive 15 minutes early to next follow up appointment in 4 months or schedule an appointment sooner if needed. Get your labs checked in [address]. Patient given educational handouts and has had all questions answered. Patient voices understanding and agrees to plans along with risks and benefits of plan. Patient isinstructed to continue prior meds, diet, and exercise plans unless instructed otherwise. Patient agrees to follow up as instructed and sooner if needed. Patient agrees to go to ER if condition becomes emergent. Notesmay be completed with dictation device and spelling errors may occur.       Return in about 4 months (around 8/9/2019) for f/u vyvanse.

## 2019-07-31 DIAGNOSIS — F98.8 ATTENTION DEFICIT DISORDER (ADD) WITHOUT HYPERACTIVITY: Chronic | ICD-10-CM

## 2019-08-12 ENCOUNTER — OFFICE VISIT (OUTPATIENT)
Dept: PRIMARY CARE CLINIC | Age: 34
End: 2019-08-12
Payer: COMMERCIAL

## 2019-08-12 VITALS
BODY MASS INDEX: 20.16 KG/M2 | RESPIRATION RATE: 20 BRPM | OXYGEN SATURATION: 99 % | DIASTOLIC BLOOD PRESSURE: 80 MMHG | HEART RATE: 84 BPM | WEIGHT: 133 LBS | HEIGHT: 68 IN | SYSTOLIC BLOOD PRESSURE: 120 MMHG

## 2019-08-12 DIAGNOSIS — F90.2 ATTENTION DEFICIT HYPERACTIVITY DISORDER (ADHD), COMBINED TYPE: Primary | ICD-10-CM

## 2019-08-12 DIAGNOSIS — K64.4 EXTERNAL HEMORRHOID: ICD-10-CM

## 2019-08-12 PROCEDURE — 99214 OFFICE O/P EST MOD 30 MIN: CPT | Performed by: FAMILY MEDICINE

## 2019-08-12 RX ORDER — HYDROCORTISONE ACETATE 25 MG/1
25 SUPPOSITORY RECTAL EVERY 12 HOURS
Qty: 20 SUPPOSITORY | Refills: 1 | Status: SHIPPED | OUTPATIENT
Start: 2019-08-12 | End: 2021-11-11

## 2019-08-12 ASSESSMENT — ENCOUNTER SYMPTOMS
DIARRHEA: 0
CONSTIPATION: 0
ABDOMINAL PAIN: 0
COUGH: 0
SHORTNESS OF BREATH: 0
CHEST TIGHTNESS: 0
NAUSEA: 0
VOMITING: 0
WHEEZING: 0

## 2019-08-12 NOTE — PROGRESS NOTES
Abdominal: Soft. Bowel sounds are normal. He exhibits no distension and no mass. There is no tenderness. There is no rebound and no guarding. Musculoskeletal:        Right lower leg: He exhibits no edema. Left lower leg: He exhibits no edema. Neurological: He is alert and oriented to person, place, and time. Coordination and gait normal.   Skin: Skin is warm and dry. He is not diaphoretic. No cyanosis. Nails show no clubbing. Psychiatric: His speech is normal and behavior is normal. Judgment normal. His mood appears not anxious. Cognition and memory are normal. He does not exhibit a depressed mood. He expresses no homicidal and no suicidal ideation. Nursing note and vitals reviewed. Assessment:    ICD-10-CM    1. Attention deficit hyperactivity disorder (ADHD), combined type F90.2    2. External hemorrhoid K64.4        Plan:   Doing well overalll, stressors and relations issues doing better overall. There are no signs of any abuse, misuse, or usage of the controlled substance in a way that is not directed. meds for hemorrhoids but worsening and start fiber. No orders of the defined types were placed in this encounter. Orders Placed This Encounter   Medications    triamcinolone (KENALOG) 0.1 % ointment     Sig: Apply topically 2 times daily for 7 days     Dispense:  80 g     Refill:  1    hydrocortisone (ANUSOL-HC) 25 MG suppository     Sig: Place 1 suppository rectally every 12 hours     Dispense:  20 suppository     Refill:  1        Medications Discontinued During This Encounter   Medication Reason    escitalopram (LEXAPRO) 10 MG tablet     lisdexamfetamine (VYVANSE) 50 MG capsule      Patient Instructions   Start taking over the counter generic psyllium husk fiber twice daily with meals. Do this until you get soft stools. Continue daily. If you have too loose of stools, cut down to once daily with a meal.  If you remain constipated increase to 3 times daily.

## 2019-08-12 NOTE — PATIENT INSTRUCTIONS
Start taking over the counter generic psyllium husk fiber twice daily with meals. Do this until you get soft stools. Continue daily. If you have too loose of stools, cut down to once daily with a meal.  If you remain constipated increase to 3 times daily.

## 2020-01-30 ENCOUNTER — OFFICE VISIT (OUTPATIENT)
Dept: PRIMARY CARE CLINIC | Age: 35
End: 2020-01-30
Payer: COMMERCIAL

## 2020-01-30 VITALS
TEMPERATURE: 98 F | WEIGHT: 140 LBS | HEIGHT: 68 IN | RESPIRATION RATE: 20 BRPM | HEART RATE: 133 BPM | BODY MASS INDEX: 21.22 KG/M2 | OXYGEN SATURATION: 98 % | SYSTOLIC BLOOD PRESSURE: 100 MMHG | DIASTOLIC BLOOD PRESSURE: 68 MMHG

## 2020-01-30 PROBLEM — F17.210 CIGARETTE SMOKER: Status: ACTIVE | Noted: 2020-01-30

## 2020-01-30 PROCEDURE — 99406 BEHAV CHNG SMOKING 3-10 MIN: CPT | Performed by: FAMILY MEDICINE

## 2020-01-30 PROCEDURE — 99395 PREV VISIT EST AGE 18-39: CPT | Performed by: FAMILY MEDICINE

## 2020-01-30 RX ORDER — ESCITALOPRAM OXALATE 10 MG/1
10 TABLET ORAL DAILY
Qty: 30 TABLET | Refills: 3
Start: 2020-01-30 | End: 2020-04-21 | Stop reason: SDUPTHER

## 2020-01-30 ASSESSMENT — ENCOUNTER SYMPTOMS
ABDOMINAL PAIN: 0
COUGH: 0
DIARRHEA: 0
CONSTIPATION: 0
NAUSEA: 0
WHEEZING: 0
VOMITING: 0
SHORTNESS OF BREATH: 0
CHEST TIGHTNESS: 0

## 2020-01-30 NOTE — PROGRESS NOTES
Darylene Nab is a 36 y.o. male who presents today for   Chief Complaint   Patient presents with    ADHD    3 Month Follow-Up    Anxiety     wants to change from Lexapro 10 mg       HPI  Patient is here for f/u ADHD and chronic anxiety as well as annual. He states Vyvanse is helping with ADHD Sx. Pt reports still smoking cigarettes 1 ppd. He states he started himself on Lexapro at 10 mg ~3 weeks ago, a dosage he previously found effective for his anxiety. He notes that it helps with anxiety but that he feels fatigue in the mornings when taking med. No change in PMH, family, social, or surgical history unless mentioned above. Review of Systems   Constitutional: Positive for fatigue. Negative for chills and fever. Respiratory: Negative for cough, chest tightness, shortness of breath and wheezing. Cardiovascular: Negative for chest pain, palpitations and leg swelling. Gastrointestinal: Negative for abdominal pain, constipation, diarrhea, nausea and vomiting. Genitourinary: Negative for difficulty urinating, dysuria and frequency. Psychiatric/Behavioral: Positive for decreased concentration. Negative for agitation and dysphoric mood. The patient is nervous/anxious and is hyperactive. Past Medical History:   Diagnosis Date    Asthma     as a child    Enlarged prostate     Hemorrhoid     Kidney stones     Rectal bleed        Current Outpatient Medications   Medication Sig Dispense Refill    escitalopram (LEXAPRO) 10 MG tablet Take 1 tablet by mouth daily 30 tablet 3    lisdexamfetamine (VYVANSE) 50 MG capsule Take 1 capsule by mouth every morning for 30 days.  30 capsule 0    hydrocortisone (ANUSOL-HC) 25 MG suppository Place 1 suppository rectally every 12 hours 20 suppository 1    fexofenadine (ALLEGRA) 180 MG tablet Take 1 tablet by mouth daily 90 tablet 3    naproxen (NAPROSYN) 500 MG tablet Take 1 tablet by mouth 2 times daily (with meals) 60 tablet 11    fluticasone (FLONASE) Chest:      Chest wall: No tenderness. Abdominal:      General: Bowel sounds are normal. There is no distension. Palpations: Abdomen is soft. There is no mass. Tenderness: There is no abdominal tenderness. There is no guarding or rebound. Musculoskeletal:      Right lower leg: No edema. Left lower leg: No edema. Skin:     General: Skin is warm and dry. Nails: There is no clubbing. Neurological:      Mental Status: He is alert and oriented to person, place, and time. Coordination: Coordination normal.      Gait: Gait normal.   Psychiatric:         Mood and Affect: Mood is anxious. Mood is not depressed. Speech: Speech normal.         Behavior: Behavior normal.         Thought Content: Thought content does not include homicidal or suicidal ideation. Judgment: Judgment normal.         Assessment:    ICD-10-CM    1. Routine general medical examination at a health care facility Z00.00    2. Cigarette smoker F17.210    3. Attention deficit hyperactivity disorder (ADHD), combined type F90.2    4. ARI (generalized anxiety disorder) F41.1        Plan:   If fatigue not improving in 2-3 weeks, taper off Lexapro and change to Effexor. ADHD controlled. F/u UDS. There are no signs of any abuse, misuse, or usage of the controlled substance in a way that is not directed. Approximately 7 minutes of education was provided about quit smoking and the harms of tobacco.  Patient does show understanding. Patient has  the desire to quit smoking in the near future. Start anxiety tx first      No orders of the defined types were placed in this encounter.     Orders Placed This Encounter   Medications    escitalopram (LEXAPRO) 10 MG tablet     Sig: Take 1 tablet by mouth daily     Dispense:  30 tablet     Refill:  3     Medications Discontinued During This Encounter   Medication Reason    lisdexamfetamine (VYVANSE) 50 MG capsule LIST CLEANUP     There are no Patient Instructions on file for this visit. Patient given educational handouts and has had all questions answered. Patient voices understanding and agrees to plans along with risks and benefits of plan. Patient isinstructed to continue prior meds, diet, and exercise plans unless instructed otherwise. Patient agrees to follow up as instructed and sooner if needed. Patient agrees to go to ER if condition becomes emergent. Notesmay be completed with dictation device and spelling errors may occur. Remington Reynolds, am scribing for and in the presence of Dr. William Riedel. 1/30/2020   I, Dr. Mandi Mota, the medical provider for the encounter with patient on 1/30/2020 at 10:26 AM have reviewed my scribe's documentation in earnest and take sole ownership of the intellectual property represented in documentation by my medical scribe and myself within this document. Some errors may occur in proofreading. No follow-ups on file.

## 2020-04-13 NOTE — TELEPHONE ENCOUNTER
Hood Taylor called to request a refill on his medication. Last office visit : Visit date not found   Next office visit : Visit date not found     Last UDS:   Amphetamine Screen, Urine   Date Value Ref Range Status   04/09/2019 Pos  Final     Barbiturate Screen, Urine   Date Value Ref Range Status   04/09/2019 -  Final     Benzodiazepine Screen, Urine   Date Value Ref Range Status   04/09/2019 -  Final     Buprenorphine Urine   Date Value Ref Range Status   04/09/2019 -  Final     Cocaine Metabolite Screen, Urine   Date Value Ref Range Status   04/09/2019 -  Final     Gabapentin Screen, Urine   Date Value Ref Range Status   04/09/2019 -  Final     MDMA, Urine   Date Value Ref Range Status   04/09/2019 -  Final     Methamphetamine, Urine   Date Value Ref Range Status   04/09/2019 -  Final     Opiate Scrn, Ur   Date Value Ref Range Status   04/09/2019 -  Final     Oxycodone Screen, Ur   Date Value Ref Range Status   04/09/2019 -  Final     PCP Screen, Urine   Date Value Ref Range Status   04/09/2019 -  Final     Propoxyphene Screen, Urine   Date Value Ref Range Status   04/09/2019 -  Final     THC Screen, Urine   Date Value Ref Range Status   04/09/2019 -  Final     Tricyclic Antidepressants, Urine   Date Value Ref Range Status   04/09/2019 -  Final       Last José Antonio Patel: 04/16/2020  Medication Contract: na   Last Brody 02/15/2020  Requested Prescriptions     Pending Prescriptions Disp Refills    lisdexamfetamine (VYVANSE) 50 MG capsule 30 capsule 0     Sig: Take 1 capsule by mouth every morning for 30 days.  lisdexamfetamine (VYVANSE) 50 MG capsule 30 capsule 0     Sig: Take 1 capsule by mouth every morning for 30 days. Please approve or refuse this medication.    Rasheed Mars

## 2020-04-21 RX ORDER — ESCITALOPRAM OXALATE 10 MG/1
10 TABLET ORAL DAILY
Qty: 30 TABLET | Refills: 3 | Status: SHIPPED | OUTPATIENT
Start: 2020-04-21 | End: 2020-04-29 | Stop reason: ALTCHOICE

## 2020-04-29 ENCOUNTER — TELEMEDICINE (OUTPATIENT)
Dept: PRIMARY CARE CLINIC | Age: 35
End: 2020-04-29
Payer: COMMERCIAL

## 2020-04-29 VITALS — WEIGHT: 143 LBS | HEIGHT: 68 IN | BODY MASS INDEX: 21.67 KG/M2

## 2020-04-29 DIAGNOSIS — Z11.3 ROUTINE SCREENING FOR STI (SEXUALLY TRANSMITTED INFECTION): ICD-10-CM

## 2020-04-29 LAB
HEPATITIS C ANTIBODY INTERPRETATION: NORMAL
HIV-1 P24 AG: NORMAL
RAPID HIV 1&2: NORMAL

## 2020-04-29 PROCEDURE — 99214 OFFICE O/P EST MOD 30 MIN: CPT | Performed by: FAMILY MEDICINE

## 2020-04-29 RX ORDER — PAROXETINE 10 MG/1
10 TABLET, FILM COATED ORAL DAILY
Qty: 60 TABLET | Refills: 1 | Status: SHIPPED | OUTPATIENT
Start: 2020-04-29 | End: 2020-07-08 | Stop reason: SDUPTHER

## 2020-04-29 ASSESSMENT — ENCOUNTER SYMPTOMS
WHEEZING: 0
CONSTIPATION: 0
COUGH: 0
NAUSEA: 0
DIARRHEA: 0
SHORTNESS OF BREATH: 0
CHEST TIGHTNESS: 0
ABDOMINAL PAIN: 0
VOMITING: 0

## 2020-04-29 NOTE — PROGRESS NOTES
2020    TELEHEALTH EVALUATION -- Audio/Visual (During GRJDF-98 public health emergency)    HPI:    Alondra Freeman (:  1985) has requested an audio/video evaluation for the following concern(s):    Patient presents today via video visit for ADHD. Notes he has been doing well overall. He had some issues at the last visit w/ lexapro including daytime sedation. He notes that the ADHD meds are doing well. He notes that he and his fiance split up about 1 wk ago and has some stress from that. Pt notes a different sexual partner. He has stopped that relationship and they are doing counseling to mend things. Review of Systems   Constitutional: Negative for chills and fever. Respiratory: Negative for cough, chest tightness, shortness of breath and wheezing. Cardiovascular: Negative for chest pain, palpitations and leg swelling. Gastrointestinal: Negative for abdominal pain, constipation, diarrhea, nausea and vomiting. Genitourinary: Negative for difficulty urinating, dysuria and frequency. Psychiatric/Behavioral: Negative for dysphoric mood, self-injury and suicidal ideas. The patient is nervous/anxious. Prior to Visit Medications    Medication Sig Taking? Authorizing Provider   PARoxetine (PAXIL) 10 MG tablet Take 1 tablet by mouth daily And increase to 2 tabs daily after 2 week Yes Vale Grider MD   lisdexamfetamine (VYVANSE) 50 MG capsule Take 1 capsule by mouth every morning for 30 days. Yes Vale Grider MD   lisdexamfetamine (VYVANSE) 50 MG capsule Take 1 capsule by mouth every morning for 30 days.  Yes Vale Grider MD   hydrocortisone (ANUSOL-HC) 25 MG suppository Place 1 suppository rectally every 12 hours Yes Vale Grider MD   fexofenadine TY [facility]) 180 MG tablet Take 1 tablet by mouth daily Yes Vale Grider MD   naproxen (NAPROSYN) 500 MG tablet Take 1 tablet by mouth 2 times daily (with meals) Yes Vale Grider MD   fluticasone (FLONASE) 50 MCG/ACT nasal spray 1 spray by Nasal route daily Yes Aditi Olivares MD   Compression Sleeves R elbow compression sleeve  Dx: Tendonitis, right upper extremity Yes Aditi Olivares MD   Multiple Vitamin (MULTI VITAMIN DAILY) TABS Take 1 tablet by mouth daily Yes Alyssa Ledezma MD   nicotine (NICODERM CQ) 21 MG/24HR Place 1 patch onto the skin every 24 hours  Aditi Olivares MD       Social History     Tobacco Use    Smoking status: Current Every Day Smoker     Packs/day: 0.50    Smokeless tobacco: Never Used   Substance Use Topics    Alcohol use: Yes     Alcohol/week: 3.0 - 4.0 standard drinks     Types: 3 - 4 Cans of beer per week     Comment: occassionally    Drug use: No        No Known Allergies,   Past Medical History:   Diagnosis Date    Asthma     as a child    Enlarged prostate     Hemorrhoid     Kidney stones     Rectal bleed    ,   Past Surgical History:   Procedure Laterality Date    COLONOSCOPY N/A 5/25/2016    Dr [place] internal hemorrhoids, HP x 3    MOUTH SURGERY      Tooth extraction    WISDOM TOOTH EXTRACTION     ,   Social History     Tobacco Use    Smoking status: Current Every Day Smoker     Packs/day: 0.50    Smokeless tobacco: Never Used   Substance Use Topics    Alcohol use:  Yes     Alcohol/week: 3.0 - 4.0 standard drinks     Types: 3 - 4 Cans of beer per week     Comment: occassionally    Drug use: No   ,   Family History   Problem Relation Age of Onset    Breast Cancer Mother     Colon Polyps Father     Colon Cancer Paternal Grandmother     Colon Polyps Paternal Grandmother     Esophageal Cancer Neg Hx     Liver Cancer Neg Hx     Liver Disease Neg Hx     Stomach Cancer Neg Hx     Rectal Cancer Neg Hx    ,   Immunization History   Administered Date(s) Administered    Hepatitis B 08/04/2008    Influenza Vaccine, unspecified formulation 10/10/2016, 11/15/2017    Influenza Virus Vaccine 10/06/2014    Influenza Whole 10/22/2015    Pneumococcal Polysaccharide ([id number]) 06/05/2018    Tdap (Boostrix, Adacel) 03/03/2016    Tetanus 03/03/2016   ,   Health Maintenance   Topic Date Due    Varicella vaccine (1 of 2 - 2-dose childhood series) 02/23/1987    HIV screen  02/23/2001    Flu vaccine (Season Ended) 09/01/2020    DTaP/Tdap/Td vaccine (2 - Td) 03/03/2026    Pneumococcal 0-64 years Vaccine  Completed    Hepatitis A vaccine  Aged Out    Hepatitis B vaccine  Aged Out    Hib vaccine  Aged Out    Meningococcal (ACWY) vaccine  Aged Out       PHYSICAL EXAMINATION:  [ INSTRUCTIONS:  \"[x]\" Indicates a positive item  \"[]\" Indicates a negative item  -- DELETE ALL ITEMS NOT EXAMINED]  Vital Signs: (As obtained by patient/caregiver or practitioner observation)    Blood pressure-  Heart rate-    Respiratory rate-    Temperature-  Pulse oximetry-     Constitutional: [x] Appears well-developed and well-nourished [] No apparent distress      [] Abnormal-   Mental status  [x] Alert and awake  [x] Oriented to person/place/time [x]Able to follow commands      Eyes:  EOM    [x]  Normal  [] Abnormal-  Sclera  [x]  Normal  [] Abnormal -         Discharge []  None visible  [] Abnormal -    HENT:   [x] Normocephalic, atraumatic.   [] Abnormal   [x] Mouth/Throat: Mucous membranes are moist.     External Ears [x] Normal  [] Abnormal-     Neck: [x] No visualized mass     Pulmonary/Chest: [x] Respiratory effort normal.  [x] No visualized signs of difficulty breathing or respiratory distress        [] Abnormal-      Musculoskeletal:   [x] Normal gait with no signs of ataxia         [x] Normal range of motion of neck        [] Abnormal-       Neurological:        [x] No Facial Asymmetry (Cranial nerve 7 motor function) (limited exam to video visit)          [x] No gaze palsy        [] Abnormal-         Skin:        [x] No significant exanthematous lesions or discoloration noted on facial skin         [] Abnormal-            Psychiatric:       [x] Normal Affect [x] No Hallucinations        [] Abnormal- Other pertinent observable physical exam findings-     ASSESSMENT/PLAN:    ICD-10-CM    1. Major depressive disorder with single episode, in partial remission ([facility] [address] F32.4    2. Attention deficit disorder (ADD) without hyperactivity F98.8    3. Attention deficit hyperactivity disorder (ADHD), combined type F90.2    4. Routine screening for STI (sexually transmitted infection) Z11.3 RPR Reflex to Titer and TPPA     HIV Screen     Chlamydia Trachomatis & Neisseria gonorrhoeae (GC) by amplified detection     Hepatitis C Antibody     Will have testing completed due to change a partner. Change to paxil as ARI is uncontrolled. Orders Placed This Encounter   Medications    PARoxetine (PAXIL) 10 MG tablet     Sig: Take 1 tablet by mouth daily And increase to 2 tabs daily after 2 week     Dispense:  60 tablet     Refill:  1       Orders Placed This Encounter   Procedures    Chlamydia Trachomatis & Neisseria gonorrhoeae (GC) by amplified detection     Change to urine study     Standing Status:   Future     Standing Expiration Date:   4/29/2021     Order Specific Question:   Source: Answer:   Cervical    RPR Reflex to Titer and TPPA     Standing Status:   Future     Standing Expiration Date:   4/29/2021    HIV Screen     Standing Status:   Future     Standing Expiration Date:   4/29/2021    Hepatitis C Antibody     Standing Status:   Future     Standing Expiration Date:   4/29/2021       No follow-ups on file. Magdalena Stockton is a 30 y.o. male being evaluated by a Virtual Visit (video visit) encounter to address concerns as mentioned above. A caregiver was present when appropriate. Due to this being a TeleHealth encounter (During XSKZ-14 public health emergency), evaluation of the following organ systems was limited: Vitals/Constitutional/EENT/Resp/CV/GI//MS/Neuro/Skin/Heme-Lymph-Imm.   Pursuant to the emergency declaration under the [address] [facility] waiver authority

## 2020-04-30 LAB — RPR: NORMAL

## 2020-05-02 LAB
CHLAMYDIA TRACHOMATIS AMPLIFIED DET: NEGATIVE
N GONORRHOEAE AMPLIFIED DET: NEGATIVE
SPECIMEN SOURCE: NORMAL

## 2020-06-04 ENCOUNTER — TELEMEDICINE (OUTPATIENT)
Dept: PRIMARY CARE CLINIC | Age: 35
End: 2020-06-04
Payer: COMMERCIAL

## 2020-06-04 PROCEDURE — 99213 OFFICE O/P EST LOW 20 MIN: CPT | Performed by: FAMILY MEDICINE

## 2020-06-04 ASSESSMENT — ENCOUNTER SYMPTOMS
WHEEZING: 0
VOMITING: 0
CHEST TIGHTNESS: 0
NAUSEA: 0
COUGH: 0
SHORTNESS OF BREATH: 0
ABDOMINAL PAIN: 0
CONSTIPATION: 0
DIARRHEA: 0

## 2020-06-04 NOTE — PROGRESS NOTES
Td) 03/03/2026    Pneumococcal 0-64 years Vaccine  Completed    HIV screen  Completed    Hepatitis A vaccine  Aged Out    Hepatitis B vaccine  Aged Out    Hib vaccine  Aged Out    Meningococcal (ACWY) vaccine  Aged Out       PHYSICAL EXAMINATION:  [ INSTRUCTIONS:  \"[x]\" Indicates a positive item  \"[]\" Indicates a negative item  -- DELETE ALL ITEMS NOT EXAMINED]  Vital Signs: (As obtained by patient/caregiver or practitioner observation)    Blood pressure-  Heart rate-    Respiratory rate-    Temperature-  Pulse oximetry-     Constitutional: [x] Appears well-developed and well-nourished [] No apparent distress      [] Abnormal-   Mental status  [x] Alert and awake  [x] Oriented to person/place/time [x]Able to follow commands      Eyes:  EOM    [x]  Normal  [] Abnormal-  Sclera  [x]  Normal  [] Abnormal -         Discharge []  None visible  [] Abnormal -    HENT:   [x] Normocephalic, atraumatic. [] Abnormal   [x] Mouth/Throat: Mucous membranes are moist.     External Ears [x] Normal  [] Abnormal-     Neck: [x] No visualized mass     Pulmonary/Chest: [x] Respiratory effort normal.  [x] No visualized signs of difficulty breathing or respiratory distress        [] Abnormal-      Musculoskeletal:   [x] Normal gait with no signs of ataxia         [x] Normal range of motion of neck        [] Abnormal-       Neurological:        [x] No Facial Asymmetry (Cranial nerve 7 motor function) (limited exam to video visit)          [x] No gaze palsy        [] Abnormal-         Skin:        [x] No significant exanthematous lesions or discoloration noted on facial skin         [] Abnormal-            Psychiatric:       [x] Normal Affect [x] No Hallucinations        [] Abnormal-     Other pertinent observable physical exam findings-     ASSESSMENT/PLAN:    ICD-10-CM    1. Major depressive disorder with single episode, in partial remission ([facility] [address] F32.4    2.  Attention deficit disorder (ADD) without hyperactivity F98.8        Sexual electronic signature was used to authenticate this note.

## 2020-06-22 ENCOUNTER — VIRTUAL VISIT (OUTPATIENT)
Dept: PRIMARY CARE CLINIC | Age: 35
End: 2020-06-22
Payer: COMMERCIAL

## 2020-06-22 PROCEDURE — 99213 OFFICE O/P EST LOW 20 MIN: CPT | Performed by: FAMILY MEDICINE

## 2020-06-22 ASSESSMENT — ENCOUNTER SYMPTOMS
CONSTIPATION: 0
VOMITING: 0
COUGH: 0
SHORTNESS OF BREATH: 0
CHEST TIGHTNESS: 0
ABDOMINAL PAIN: 0
NAUSEA: 0
DIARRHEA: 0
WHEEZING: 0

## 2020-06-22 NOTE — PROGRESS NOTES
2020    TELEHEALTH EVALUATION -- Audio/Visual (During OUHTW-42 public health emergency)    HPI:    Lyla Hardy (:  1985) has requested an audio/video evaluation for the following concern(s):    Patient presents today via video visit for f/u sexual side effect for Paxil. Pt states medications are working well and that his anxiety and depression are controlled. He notes sexual side effect has mostly resolved. Pt due for refill for ADHD med. Review of Systems   Constitutional: Negative for chills and fever. Respiratory: Negative for cough, chest tightness, shortness of breath and wheezing. Cardiovascular: Negative for chest pain, palpitations and leg swelling. Gastrointestinal: Negative for abdominal pain, constipation, diarrhea, nausea and vomiting. Genitourinary: Negative for difficulty urinating, dysuria and frequency. Prior to Visit Medications    Medication Sig Taking? Authorizing Provider   lisdexamfetamine (VYVANSE) 50 MG capsule Take 1 capsule by mouth every morning for 30 days. Izabel Santiago MD   lisdexamfetamine (VYVANSE) 50 MG capsule Take 1 capsule by mouth every morning for 30 days. Izabel Santiago MD   PARoxetine (PAXIL) 10 MG tablet Take 1 tablet by mouth daily And increase to 2 tabs daily after 2 week  Izabel Santiago MD   hydrocortisone (ANUSOL-HC) 25 MG suppository Place 1 suppository rectally every 12 hours  Izabel Santiago MD   fexofenadine TY [facility]) 180 MG tablet Take 1 tablet by mouth daily  Izabel Santiago MD   naproxen (NAPROSYN) 500 MG tablet Take 1 tablet by mouth 2 times daily (with meals)  Izabel Santiago MD   fluticasone [facility]) 50 MCG/ACT nasal spray 1 spray by Nasal route daily  Izabel Santiago MD   Compression Sleeves R elbow compression sleeve  Dx:   Tendonitis, right upper extremity  Izabel Santiago MD   nicotine (NICODERM CQ) 21 MG/24HR Place 1 patch onto the skin every 24 hours  Izabel Santiago MD   Multiple Vitamin (MULTI VITAMIN DAILY) TABS Take 1

## 2020-07-08 RX ORDER — PAROXETINE 10 MG/1
10 TABLET, FILM COATED ORAL DAILY
Qty: 60 TABLET | Refills: 1 | Status: SHIPPED | OUTPATIENT
Start: 2020-07-08 | End: 2020-09-23 | Stop reason: ALTCHOICE

## 2020-07-21 ENCOUNTER — TELEPHONE (OUTPATIENT)
Dept: PRIMARY CARE CLINIC | Age: 35
End: 2020-07-21

## 2020-07-21 NOTE — TELEPHONE ENCOUNTER
Patient states he has not been taking his paxil for about 3 weeks, and when he was he was cutting the tablet in half. His insurance will only pay for a 30 day supply. He wants to know if you would like him to start over taking the medication?

## 2020-07-22 RX ORDER — PAROXETINE HYDROCHLORIDE 20 MG/1
20 TABLET, FILM COATED ORAL DAILY
Qty: 30 TABLET | Refills: 2 | Status: SHIPPED | OUTPATIENT
Start: 2020-07-22 | End: 2020-09-21 | Stop reason: SDUPTHER

## 2020-07-22 RX ORDER — PAROXETINE 10 MG/1
10 TABLET, FILM COATED ORAL DAILY
Qty: 10 TABLET | Refills: 0 | Status: SHIPPED | OUTPATIENT
Start: 2020-07-22 | End: 2020-09-23 | Stop reason: ALTCHOICE

## 2020-09-23 ENCOUNTER — VIRTUAL VISIT (OUTPATIENT)
Dept: PRIMARY CARE CLINIC | Age: 35
End: 2020-09-23
Payer: COMMERCIAL

## 2020-09-23 PROCEDURE — 99213 OFFICE O/P EST LOW 20 MIN: CPT | Performed by: FAMILY MEDICINE

## 2020-09-23 RX ORDER — PAROXETINE HYDROCHLORIDE 20 MG/1
20 TABLET, FILM COATED ORAL EVERY MORNING
Qty: 30 TABLET | Refills: 11 | Status: SHIPPED | OUTPATIENT
Start: 2020-09-23 | End: 2021-01-04 | Stop reason: SDUPTHER

## 2020-09-23 NOTE — PROGRESS NOTES
2020    TELEHEALTH EVALUATION -- Audio/Visual (During NXLUG-73 public health emergency)    HPI:    Amadoujuan Ward (:  1985) has requested an audio/video evaluation for the following concern(s):    Patient presents today via video visit for f/u ADD. He has been doingwell, feeling well. Notes he can cope w/ it. Notes his anxiety is improving. Review of Systems   Psychiatric/Behavioral: Negative for dysphoric mood, self-injury, sleep disturbance and suicidal ideas. The patient is not nervous/anxious. Prior to Visit Medications    Medication Sig Taking? Authorizing Provider   PARoxetine (PAXIL) 20 MG tablet Take 1 tablet by mouth every morning Yes José Manuel Grant MD   lisdexamfetamine (VYVANSE) 50 MG capsule Take 1 capsule by mouth every morning for 30 days. José Manuel Grant MD   lisdexamfetamine (VYVANSE) 50 MG capsule Take 1 capsule by mouth every morning for 30 days. José Manuel Grant MD   hydrocortisone (ANUSOL-HC) 25 MG suppository Place 1 suppository rectally every 12 hours  José Manuel Grant MD   fexofenadine TY [facility]) 180 MG tablet Take 1 tablet by mouth daily  José Manuel Grant MD   naproxen (NAPROSYN) 500 MG tablet Take 1 tablet by mouth 2 times daily (with meals)  José Manuel Grant MD   fluticasone [facility]) 50 MCG/ACT nasal spray 1 spray by Nasal route daily  José Manuel Grant MD   Compression Sleeves R elbow compression sleeve  Dx: Tendonitis, right upper extremity  José Manuel Grant MD   nicotine (NICODERM CQ) 21 MG/24HR Place 1 patch onto the skin every 24 hours  José Manuel Grant MD   Multiple Vitamin (MULTI VITAMIN DAILY) TABS Take 1 tablet by mouth daily  Geraldine Haney MD       Social History     Tobacco Use    Smoking status: Current Every Day Smoker     Packs/day: 0.50    Smokeless tobacco: Never Used   Substance Use Topics    Alcohol use:  Yes     Alcohol/week: 3.0 - 4.0 standard drinks     Types: 3 - 4 Cans of beer per week     Comment: occassionally    Drug use: No        No Known Allergies,   Past Medical History:   Diagnosis Date    Asthma     as a child    Enlarged prostate     Hemorrhoid     Kidney stones     Rectal bleed    ,   Past Surgical History:   Procedure Laterality Date    COLONOSCOPY N/A 5/25/2016    Dr Stefany Rosas internal hemorrhoids, HP x 3    MOUTH SURGERY      Tooth extraction    WISDOM TOOTH EXTRACTION     ,   Social History     Tobacco Use    Smoking status: Current Every Day Smoker     Packs/day: 0.50    Smokeless tobacco: Never Used   Substance Use Topics    Alcohol use:  Yes     Alcohol/week: 3.0 - 4.0 standard drinks     Types: 3 - 4 Cans of beer per week     Comment: occassionally    Drug use: No   ,   Family History   Problem Relation Age of Onset    Breast Cancer Mother     Colon Polyps Father     Colon Cancer Paternal Grandmother     Colon Polyps Paternal Grandmother     Esophageal Cancer Neg Hx     Liver Cancer Neg Hx     Liver Disease Neg Hx     Stomach Cancer Neg Hx     Rectal Cancer Neg Hx    ,   Immunization History   Administered Date(s) Administered    Hepatitis B 08/04/2008    Influenza Vaccine, unspecified formulation 10/10/2016, 11/15/2017    Influenza Virus Vaccine 10/06/2014    Influenza Whole 10/22/2015    Pneumococcal Polysaccharide (Mdraotbes56) 06/05/2018    Tdap (Boostrix, Adacel) 03/03/2016    Tetanus 03/03/2016   ,   Health Maintenance   Topic Date Due    Varicella vaccine (1 of 2 - 2-dose childhood series) [birth year]/1987    Flu vaccine (1) 09/01/2020    DTaP/Tdap/Td vaccine (2 - Td) 03/03/2026    Pneumococcal 0-64 years Vaccine  Completed    HIV screen  Completed    Hepatitis A vaccine  Aged Out    Hepatitis B vaccine  Aged Out    Hib vaccine  Aged Out    Meningococcal (ACWY) vaccine  Aged Out       PHYSICAL EXAMINATION:  [ INSTRUCTIONS:  \"[x]\" Indicates a positive item  \"[]\" Indicates a negative item  -- DELETE ALL ITEMS NOT EXAMINED]  Vital Signs: (As obtained by patient/caregiver or practitioner observation)    Blood pressure-  Heart rate-    Respiratory rate-    Temperature-  Pulse oximetry-     Constitutional: [x] Appears well-developed and well-nourished [] No apparent distress      [] Abnormal-   Mental status  [x] Alert and awake  [x] Oriented to person/place/time [x]Able to follow commands      Eyes:  EOM    [x]  Normal  [] Abnormal-  Sclera  [x]  Normal  [] Abnormal -         Discharge []  None visible  [] Abnormal -    HENT:   [x] Normocephalic, atraumatic. [] Abnormal   [x] Mouth/Throat: Mucous membranes are moist.     External Ears [x] Normal  [] Abnormal-     Neck: [x] No visualized mass     Pulmonary/Chest: [x] Respiratory effort normal.  [x] No visualized signs of difficulty breathing or respiratory distress        [] Abnormal-      Musculoskeletal:   [x] Normal gait with no signs of ataxia         [x] Normal range of motion of neck        [] Abnormal-       Neurological:        [x] No Facial Asymmetry (Cranial nerve 7 motor function) (limited exam to video visit)          [x] No gaze palsy        [] Abnormal-         Skin:        [x] No significant exanthematous lesions or discoloration noted on facial skin         [] Abnormal-            Psychiatric:       [x] Normal Affect [x] No Hallucinations        [] Abnormal-     Other pertinent observable physical exam findings-     ASSESSMENT/PLAN:    ICD-10-CM    1. Attention deficit disorder (ADD) without hyperactivity  F98.8    2. ARI (generalized anxiety disorder)  F41.1        Continue current plan. Appropriate usage    Orders Placed This Encounter   Medications    PARoxetine (PAXIL) 20 MG tablet     Sig: Take 1 tablet by mouth every morning     Dispense:  30 tablet     Refill:  11       No orders of the defined types were placed in this encounter.       Return in about 4 months (around 1/23/2021) for OV (-[facility]), annual.    Leatha Hayes is a 30 y.o. male being evaluated by a Virtual Visit (video visit) encounter to address concerns as mentioned above. A caregiver was present when appropriate. Due to this being a TeleHealth encounter (During GRSCN-65 public health emergency), evaluation of the following organ systems was limited: Vitals/Constitutional/EENT/Resp/CV/GI//MS/Neuro/Skin/Heme-Lymph-Imm. Pursuant to the emergency declaration under the [address] [place] [address], [address] [place] [address] authority and the [employer] Act, this Virtual Visit was conducted with patient's (and/or legal guardian's) consent, to reduce the patient's risk of exposure to COVID-19 and provide necessary medical care. The patient (and/or legal guardian) has also been advised to contact this office for worsening conditions or problems, and seek emergency medical treatment and/or call 911 if deemed necessary. Services were provided through a video synchronous discussion virtually to substitute for in-person clinic visit. Patient and provider were located at their individual homes or provider at secure site for business. It is possible that material may be posted from a notepad that is used for a Dragon dictation device for dictating notes outside the EMR and I am the original author of all of this content. --Tianna Aj MD on 10/12/2020 at 7:50 PM    An electronic signature was used to authenticate this note.

## 2020-10-26 ENCOUNTER — TELEPHONE (OUTPATIENT)
Dept: PRIMARY CARE CLINIC | Age: 35
End: 2020-10-26

## 2020-10-26 NOTE — TELEPHONE ENCOUNTER
Pt needed refill on ADHD meds. I spoke with pt ask him to come in this week for a UDS.  He will be in this week for that test.

## 2021-01-04 DIAGNOSIS — F98.8 ATTENTION DEFICIT DISORDER (ADD) WITHOUT HYPERACTIVITY: Chronic | ICD-10-CM

## 2021-01-05 RX ORDER — PAROXETINE HYDROCHLORIDE 20 MG/1
20 TABLET, FILM COATED ORAL EVERY MORNING
Qty: 30 TABLET | Refills: 1 | Status: SHIPPED | OUTPATIENT
Start: 2021-01-05 | End: 2021-10-11

## 2021-01-05 NOTE — TELEPHONE ENCOUNTER
Last office visit : 9/23/2020   Next office visit : 2/3/2021     Last UDS:   Amphetamine Screen, Urine   Date Value Ref Range Status   04/09/2019 Pos  Final     Barbiturate Screen, Urine   Date Value Ref Range Status   04/09/2019 -  Final     Benzodiazepine Screen, Urine   Date Value Ref Range Status   04/09/2019 -  Final     Buprenorphine Urine   Date Value Ref Range Status   04/09/2019 -  Final     Cocaine Metabolite Screen, Urine   Date Value Ref Range Status   04/09/2019 -  Final     Gabapentin Screen, Urine   Date Value Ref Range Status   04/09/2019 -  Final     MDMA, Urine   Date Value Ref Range Status   04/09/2019 -  Final     Methamphetamine, Urine   Date Value Ref Range Status   04/09/2019 -  Final     Opiate Scrn, Ur   Date Value Ref Range Status   04/09/2019 -  Final     Oxycodone Screen, Ur   Date Value Ref Range Status   04/09/2019 -  Final     PCP Screen, Urine   Date Value Ref Range Status   04/09/2019 -  Final     Propoxyphene Screen, Urine   Date Value Ref Range Status   04/09/2019 -  Final     THC Screen, Urine   Date Value Ref Range Status   04/09/2019 -  Final     Tricyclic Antidepressants, Urine   Date Value Ref Range Status   04/09/2019 -  Final       Last Susan Johnss: 10-  Medication Contract: 2016   Last Fill: 11-26    Requested Prescriptions     Pending Prescriptions Disp Refills    PARoxetine (PAXIL) 20 MG tablet 30 tablet 11     Sig: Take 1 tablet by mouth every morning    lisdexamfetamine (VYVANSE) 50 MG capsule 30 capsule 0     Sig: Take 1 capsule by mouth every morning for 30 days. Please approve or refuse this medication.    Izzy Vincent LPN

## 2021-02-03 ENCOUNTER — OFFICE VISIT (OUTPATIENT)
Dept: PRIMARY CARE CLINIC | Age: 36
End: 2021-02-03
Payer: COMMERCIAL

## 2021-02-03 VITALS
OXYGEN SATURATION: 98 % | HEIGHT: 68 IN | BODY MASS INDEX: 22.58 KG/M2 | SYSTOLIC BLOOD PRESSURE: 140 MMHG | DIASTOLIC BLOOD PRESSURE: 70 MMHG | HEART RATE: 100 BPM | WEIGHT: 149 LBS

## 2021-02-03 DIAGNOSIS — Z13.220 SCREENING, LIPID: ICD-10-CM

## 2021-02-03 DIAGNOSIS — F98.8 ATTENTION DEFICIT DISORDER (ADD) WITHOUT HYPERACTIVITY: Chronic | ICD-10-CM

## 2021-02-03 DIAGNOSIS — R53.83 FATIGUE, UNSPECIFIED TYPE: ICD-10-CM

## 2021-02-03 DIAGNOSIS — Z00.00 ROUTINE GENERAL MEDICAL EXAMINATION AT A HEALTH CARE FACILITY: Primary | ICD-10-CM

## 2021-02-03 DIAGNOSIS — R31.0 GROSS HEMATURIA: ICD-10-CM

## 2021-02-03 DIAGNOSIS — Z11.3 SCREEN FOR SEXUALLY TRANSMITTED DISEASES: ICD-10-CM

## 2021-02-03 DIAGNOSIS — F32.4 MAJOR DEPRESSIVE DISORDER WITH SINGLE EPISODE, IN PARTIAL REMISSION (HCC): Chronic | ICD-10-CM

## 2021-02-03 DIAGNOSIS — Z00.00 ROUTINE GENERAL MEDICAL EXAMINATION AT A HEALTH CARE FACILITY: ICD-10-CM

## 2021-02-03 LAB
ALCOHOL URINE: NORMAL
AMPHETAMINE SCREEN, URINE: NORMAL
APPEARANCE FLUID: CLEAR
BARBITURATE SCREEN, URINE: NORMAL
BENZODIAZEPINE SCREEN, URINE: NORMAL
BILIRUBIN, POC: ABNORMAL
BLOOD URINE, POC: ABNORMAL
BUPRENORPHINE URINE: NORMAL
CLARITY, POC: CLEAR
COCAINE METABOLITE SCREEN URINE: NORMAL
COLOR, POC: YELLOW
FENTANYL SCREEN, URINE: NORMAL
GABAPENTIN SCREEN, URINE: NORMAL
GLUCOSE URINE, POC: ABNORMAL
KETONES, POC: ABNORMAL
LEUKOCYTE EST, POC: ABNORMAL
MDMA URINE: NORMAL
METHADONE SCREEN, URINE: NORMAL
METHAMPHETAMINE, URINE: NORMAL
NITRITE, POC: ABNORMAL
OPIATE SCREEN URINE: NORMAL
OXYCODONE SCREEN URINE: NORMAL
PH, POC: 6.5
PHENCYCLIDINE SCREEN URINE: NORMAL
PROPOXYPHENE SCREEN, URINE: NORMAL
PROTEIN, POC: ABNORMAL
SPECIFIC GRAVITY, POC: 1.03
SYNTHETIC CANNABINOIDS(K2) SCREEN, URINE: NORMAL
THC SCREEN, URINE: NORMAL
TRAMADOL SCREEN URINE: NORMAL
TRICYCLIC ANTIDEPRESSANTS, UR: NORMAL
UROBILINOGEN, POC: 0.2

## 2021-02-03 PROCEDURE — 99395 PREV VISIT EST AGE 18-39: CPT | Performed by: FAMILY MEDICINE

## 2021-02-03 PROCEDURE — 81002 URINALYSIS NONAUTO W/O SCOPE: CPT | Performed by: FAMILY MEDICINE

## 2021-02-03 PROCEDURE — 99213 OFFICE O/P EST LOW 20 MIN: CPT | Performed by: FAMILY MEDICINE

## 2021-02-03 PROCEDURE — 80305 DRUG TEST PRSMV DIR OPT OBS: CPT | Performed by: FAMILY MEDICINE

## 2021-02-03 NOTE — PROGRESS NOTES
Niurka Dickey is a 30 y.o. male who presents today for   Chief Complaint   Patient presents with    Follow-up     blood in his urine on Sunday. Fatigue       HPI  Patient is here for annual and other newissues. Patient is here for 2 separate visits: annual wellness visit as well as acute and chronic issues. The below review of systems and physical exam applies to both encounters. Annual HPI:  Patient is here for annual examination today. Patient notes that he would like to be screened for sexually transmitted diseases as he has had a few different sexual partners over the last several years but also has some acute issues 2. He denies any other family medical history changes or any other big changes in his health. He continues to advance of his work remain busy. He has 3 children total continues to be actively engaged in a lives    Acute/Chronic HPI:  Patient is also here for acute issues. He is due for follow-up for his ADHD and has no issues with it at this time. He does have a history of depression but it has improved overall. He does jules some ongoing fatigue and he notes that most recently had concerning gross hematuria. He felt as though he filled up the toilet bowl with blood I was it was bright red. He does not have any history of any major bladder problems but does have a history of nephrolithiasis and states did not have any flank pain or pain that felt like a kidney stone. No change in PMH, family, social, or surgical history unless mentioned above. Review of Systems   Constitutional: Negative for chills and fever. HENT: Negative for rhinorrhea and sore throat. Eyes: Negative for itching and visual disturbance. Respiratory: Negative for cough and shortness of breath. Cardiovascular: Negative for chest pain and palpitations. Gastrointestinal: Negative for abdominal pain and nausea. Endocrine: Negative for polydipsia and polyuria.    Genitourinary: Negative for dysuria and frequency. Musculoskeletal: Negative for arthralgias and myalgias. Skin: Negative for color change and rash. Allergic/Immunologic: Negative for environmental allergies and food allergies. Neurological: Negative for dizziness and light-headedness. Hematological: Negative for adenopathy. Does not bruise/bleed easily. Psychiatric/Behavioral: Negative for dysphoric mood. The patient is not nervous/anxious. Past Medical History:   Diagnosis Date    Asthma     as a child    Enlarged prostate     Hemorrhoid     Kidney stones     Rectal bleed        Current Outpatient Medications   Medication Sig Dispense Refill    lisdexamfetamine (VYVANSE) 50 MG capsule Take 1 capsule by mouth every morning for 7 days. 7 capsule 0    lisdexamfetamine (VYVANSE) 50 MG capsule Take 1 capsule by mouth every morning for 30 days. 30 capsule 0    PARoxetine (PAXIL) 20 MG tablet Take 1 tablet by mouth every morning 30 tablet 1    fexofenadine (ALLEGRA) 180 MG tablet Take 1 tablet by mouth daily 90 tablet 3    naproxen (NAPROSYN) 500 MG tablet Take 1 tablet by mouth 2 times daily (with meals) 60 tablet 11    fluticasone (FLONASE) 50 MCG/ACT nasal spray 1 spray by Nasal route daily 1 Bottle 11    Compression Sleeves R elbow compression sleeve  Dx: Tendonitis, right upper extremity 1 each 0    Multiple Vitamin (MULTI VITAMIN DAILY) TABS Take 1 tablet by mouth daily 30 tablet 5    lisdexamfetamine (VYVANSE) 50 MG capsule Take 1 capsule by mouth every morning for 30 days. 30 capsule 0    lisdexamfetamine (VYVANSE) 50 MG capsule Take 1 capsule by mouth every morning for 30 days. 30 capsule 0    hydrocortisone (ANUSOL-HC) 25 MG suppository Place 1 suppository rectally every 12 hours (Patient not taking: Reported on 2/3/2021) 20 suppository 1    nicotine (NICODERM CQ) 21 MG/24HR Place 1 patch onto the skin every 24 hours 30 patch 1     No current facility-administered medications for this visit.         No Cardiovascular:      Rate and Rhythm: Normal rate and regular rhythm. No extrasystoles are present. Heart sounds: Normal heart sounds, S1 normal and S2 normal. No murmur. Pulmonary:      Effort: Pulmonary effort is normal. No respiratory distress. Breath sounds: Normal breath sounds. No wheezing, rhonchi or rales. Abdominal:      General: Bowel sounds are normal. There is no distension. Palpations: Abdomen is soft. Tenderness: There is no abdominal tenderness. There is no guarding. Musculoskeletal: Normal range of motion. General: No tenderness. Cervical back: Normal. He exhibits no tenderness and no bony tenderness. Thoracic back: Normal. He exhibits no tenderness and no bony tenderness. Lumbar back: Normal. He exhibits no tenderness and no bony tenderness. Right lower leg: He exhibits no swelling. No edema. Left lower leg: He exhibits no swelling. No edema. Lymphadenopathy:      Head:      Right side of head: No submental, submandibular or tonsillar adenopathy. Left side of head: No submental, submandibular or tonsillar adenopathy. Cervical: No cervical adenopathy. Upper Body:      Right upper body: No supraclavicular adenopathy. Left upper body: No supraclavicular adenopathy. Skin:     General: Skin is dry. Coloration: Skin is not pale. Findings: No erythema (on head, neck, face, extremities) or rash (on extremities, head, neck, face). Nails: There is no clubbing. Neurological:      Mental Status: He is alert and oriented to person, place, and time. Motor: No tremor or seizure activity. Gait: Gait normal.      Deep Tendon Reflexes: Reflexes are normal and symmetric. Psychiatric:         Speech: Speech normal.         Behavior: Behavior normal.         Thought Content: Thought content normal.         Judgment: Judgment normal.         Assessment:    ICD-10-CM    1.  Routine general medical examination at a health care facility  Z00.00 Chlamydia Trachomatis & Neisseria gonorrhoeae (GC) by amplified detection     HIV Screen     Hepatitis C Antibody     RPR Reflex to Titer and TPPA     Lipid Panel   2. Fatigue, unspecified type  R53.83 Comprehensive Metabolic Panel     CBC     TSH without Reflex   3. Attention deficit disorder (ADD) without hyperactivity  F98.8 lisdexamfetamine (VYVANSE) 50 MG capsule     lisdexamfetamine (VYVANSE) 50 MG capsule     POCT Rapid Drug Screen   4. Major depressive disorder with single episode, in partial remission ([facility] [address]  F32.4    5. Screen for sexually transmitted diseases  Z11.3    6. Screening, lipid  Z13.220 Lipid Panel   7. Gross hematuria  R31.0 POCT Urinalysis no Micro     CANCELED: POCT Urinalysis Dipstick w/ Micro (Auto)       Plan:   Plan #1: Annual exam  I have reviewed and assessed the patient's current health status, risk factors, and progress for available preventative care. Patient received approximately 5 minutes of counseling on COVID-19 pandemic in regards to hygiene, symptoms, following public guidelines, and precautions to take. Patient received additional 5 minutes of counseling on covid vaccination data and need to vaccinate when available. Plan #2: Acute and chronic conditions  Will do UA. Also screen for sexually transmitted illnesses. I believe that he could be passing a kidney stone  Orders Placed This Encounter   Medications    lisdexamfetamine (VYVANSE) 50 MG capsule     Sig: Take 1 capsule by mouth every morning for 7 days. Dispense:  7 capsule     Refill:  0    lisdexamfetamine (VYVANSE) 50 MG capsule     Sig: Take 1 capsule by mouth every morning for 30 days. Dispense:  30 capsule     Refill:  0     Medications Discontinued During This Encounter   Medication Reason    lisdexamfetamine (VYVANSE) 50 MG capsule REORDER     There are no Patient Instructions on file for this visit.    Patient given educational handouts and has had all questions answered. Patient voices understanding and agrees to plans along with risks and benefits of plan. Patient isinstructed to continue prior meds, diet, and exercise plans unless instructed otherwise. Patient agrees to follow up as instructed and sooner if needed. Patient agrees to go to ER if condition becomes emergent. Notesmay be completed with dictation device and spelling errors may occur. Materials may be copied and pasted from a notepad outside of EMR, all of which, I, Dr. Silverio Hardin MD, take sole intellectual ownership of and have approved adding to my note. Return in about 1 year (around 2/3/2022) for OV ([facility]), annual 2 time slots.

## 2021-02-08 DIAGNOSIS — Z13.220 SCREENING, LIPID: ICD-10-CM

## 2021-02-08 DIAGNOSIS — R53.83 FATIGUE, UNSPECIFIED TYPE: ICD-10-CM

## 2021-02-08 DIAGNOSIS — Z00.00 ROUTINE GENERAL MEDICAL EXAMINATION AT A HEALTH CARE FACILITY: ICD-10-CM

## 2021-02-08 LAB
ALBUMIN SERPL-MCNC: 4.4 G/DL (ref 3.5–5.2)
ALP BLD-CCNC: 90 U/L (ref 40–130)
ALT SERPL-CCNC: 21 U/L (ref 5–41)
ANION GAP SERPL CALCULATED.3IONS-SCNC: 9 MMOL/L (ref 7–19)
AST SERPL-CCNC: 18 U/L (ref 5–40)
BILIRUB SERPL-MCNC: <0.2 MG/DL (ref 0.2–1.2)
BUN BLDV-MCNC: 15 MG/DL (ref 6–20)
CALCIUM SERPL-MCNC: 9.3 MG/DL (ref 8.6–10)
CHLORIDE BLD-SCNC: 101 MMOL/L (ref 98–111)
CHOLESTEROL, TOTAL: 167 MG/DL (ref 160–199)
CO2: 28 MMOL/L (ref 22–29)
CREAT SERPL-MCNC: 0.8 MG/DL (ref 0.5–1.2)
GFR AFRICAN AMERICAN: >59
GFR NON-AFRICAN AMERICAN: >60
GLUCOSE BLD-MCNC: 98 MG/DL (ref 74–109)
HCT VFR BLD CALC: 45.1 % (ref 42–52)
HDLC SERPL-MCNC: 39 MG/DL (ref 55–121)
HEMOGLOBIN: 14.7 G/DL (ref 14–18)
HEPATITIS C ANTIBODY INTERPRETATION: NORMAL
HIV-1 P24 AG: NORMAL
LDL CHOLESTEROL CALCULATED: 97 MG/DL
MCH RBC QN AUTO: 29.2 PG (ref 27–31)
MCHC RBC AUTO-ENTMCNC: 32.6 G/DL (ref 33–37)
MCV RBC AUTO: 89.7 FL (ref 80–94)
PDW BLD-RTO: 12.7 % (ref 11.5–14.5)
PLATELET # BLD: 296 K/UL (ref 130–400)
PMV BLD AUTO: 9.9 FL (ref 9.4–12.4)
POTASSIUM SERPL-SCNC: 3.8 MMOL/L (ref 3.5–5)
RAPID HIV 1&2: NORMAL
RBC # BLD: 5.03 M/UL (ref 4.7–6.1)
RPR: NORMAL
SODIUM BLD-SCNC: 138 MMOL/L (ref 136–145)
TOTAL PROTEIN: 7.5 G/DL (ref 6.6–8.7)
TRIGL SERPL-MCNC: 155 MG/DL (ref 0–149)
TSH SERPL DL<=0.05 MIU/L-ACNC: 3.25 UIU/ML (ref 0.27–4.2)
WBC # BLD: 5.5 K/UL (ref 4.8–10.8)

## 2021-02-15 ASSESSMENT — ENCOUNTER SYMPTOMS
SHORTNESS OF BREATH: 0
ABDOMINAL PAIN: 0
COLOR CHANGE: 0
EYE ITCHING: 0
RHINORRHEA: 0
COUGH: 0
SORE THROAT: 0
NAUSEA: 0

## 2021-05-06 ENCOUNTER — PATIENT MESSAGE (OUTPATIENT)
Dept: PRIMARY CARE CLINIC | Age: 36
End: 2021-05-06

## 2021-05-06 ENCOUNTER — VIRTUAL VISIT (OUTPATIENT)
Dept: PRIMARY CARE CLINIC | Age: 36
End: 2021-05-06
Payer: COMMERCIAL

## 2021-05-06 DIAGNOSIS — J30.1 ALLERGIC RHINITIS DUE TO POLLEN, UNSPECIFIED SEASONALITY: ICD-10-CM

## 2021-05-06 DIAGNOSIS — F98.8 ATTENTION DEFICIT DISORDER (ADD) WITHOUT HYPERACTIVITY: Primary | ICD-10-CM

## 2021-05-06 PROCEDURE — 99213 OFFICE O/P EST LOW 20 MIN: CPT | Performed by: FAMILY MEDICINE

## 2021-05-06 NOTE — PROGRESS NOTES
2021    TELEHEALTH EVALUATION -- Audio/Visual (During - public health emergency)    HPI:    Darlin Juarez (:  1985) has requested an audio/video evaluation for the following concern(s):    Patient presents today via video visit for follow-up for ADHD and allergies. Notes not having any issues with either at this time. Due for refills. Notes he is not having any issues with his ADHD medicine or any anxiety    Review of Systems   Allergic/Immunologic: Positive for environmental allergies. Negative for food allergies. Psychiatric/Behavioral: Negative for dysphoric mood, self-injury, sleep disturbance and suicidal ideas. The patient is not nervous/anxious. Prior to Visit Medications    Medication Sig Taking? Authorizing Provider   lisdexamfetamine (VYVANSE) 50 MG capsule Take 1 capsule by mouth every morning for 30 days. Yes Alvin Tejada MD   lisdexamfetamine (VYVANSE) 50 MG capsule Take 1 capsule by mouth every morning for 30 days. Yes Alvin Tejada MD   fluticasone [facility]) 50 MCG/ACT nasal spray 1 spray by Nasal route daily  Alvin Tejada MD   fexofenadine TY [facility]) 180 MG tablet Take 1 tablet by mouth daily  Alvin Tejada MD   PARoxetine (PAXIL) 20 MG tablet Take 1 tablet by mouth every morning  Alvin Tejada MD   hydrocortisone (ANUSOL-HC) 25 MG suppository Place 1 suppository rectally every 12 hours  Patient not taking: Reported on 2/3/2021  Alvin Tejada MD   naproxen (NAPROSYN) 500 MG tablet Take 1 tablet by mouth 2 times daily (with meals)  Alvin Tejada MD   Compression Sleeves R elbow compression sleeve  Dx:   Tendonitis, right upper extremity  Alvin Tejada MD   nicotine (NICODERM CQ) 21 MG/24HR Place 1 patch onto the skin every 24 hours  Alvin Tejada MD   Multiple Vitamin (MULTI VITAMIN DAILY) TABS Take 1 tablet by mouth daily  Joey Rollins MD       Social History     Tobacco Use    Smoking status: Current Every Day Smoker     Packs/day: 0.50    Smokeless tobacco: Never Used   Substance Use Topics    Alcohol use: Yes     Alcohol/week: 3.0 - 4.0 standard drinks     Types: 3 - 4 Cans of beer per week     Comment: occassionally    Drug use: No        No Known Allergies,   Past Medical History:   Diagnosis Date    Asthma     as a child    Enlarged prostate     Hemorrhoid     Kidney stones     Rectal bleed    ,   Past Surgical History:   Procedure Laterality Date    COLONOSCOPY N/A 5/25/2016    Dr Rik Cross internal hemorrhoids, HP x 3    MOUTH SURGERY      Tooth extraction    WISDOM TOOTH EXTRACTION     ,   Social History     Tobacco Use    Smoking status: Current Every Day Smoker     Packs/day: 0.50    Smokeless tobacco: Never Used   Substance Use Topics    Alcohol use:  Yes     Alcohol/week: 3.0 - 4.0 standard drinks     Types: 3 - 4 Cans of beer per week     Comment: occassionally    Drug use: No   ,   Family History   Problem Relation Age of Onset    Breast Cancer Mother     Colon Polyps Father     Colon Cancer Paternal Grandmother     Colon Polyps Paternal Grandmother     Esophageal Cancer Neg Hx     Liver Cancer Neg Hx     Liver Disease Neg Hx     Stomach Cancer Neg Hx     Rectal Cancer Neg Hx    ,   Immunization History   Administered Date(s) Administered    Hepatitis B 08/04/2008    Influenza Vaccine, unspecified formulation 10/10/2016, 11/15/2017    Influenza Virus Vaccine 10/06/2014    Influenza Whole 10/22/2015    Pneumococcal Polysaccharide ([id number]) 06/05/2018    Tdap (Boostrix, Adacel) 03/03/2016    Tetanus 03/03/2016   ,   Health Maintenance   Topic Date Due    Varicella vaccine (1 of 2 - 2-dose childhood series) Never done    COVID-19 Vaccine (1) Never done    Flu vaccine (Season Ended) 09/01/2021    DTaP/Tdap/Td vaccine (2 - Td) 03/03/2026    Pneumococcal 0-64 years Vaccine  Completed    Hepatitis C screen  Completed    HIV screen  Completed    Hepatitis A vaccine  Aged Out    Hepatitis B vaccine Aged Out    Hib vaccine  Aged Out    Meningococcal (ACWY) vaccine  Aged Out       PHYSICAL EXAMINATION:  [ INSTRUCTIONS:  \"[x]\" Indicates a positive item  \"[]\" Indicates a negative item  -- DELETE ALL ITEMS NOT EXAMINED]  Vital Signs: (As obtained by patient/caregiver or practitioner observation)    Blood pressure-  Heart rate-    Respiratory rate-    Temperature-  Pulse oximetry-     Constitutional: [x] Appears well-developed and well-nourished [] No apparent distress      [] Abnormal-   Mental status  [x] Alert and awake  [x] Oriented to person/place/time [x]Able to follow commands      Eyes:  EOM    [x]  Normal  [] Abnormal-  Sclera  [x]  Normal  [] Abnormal -         Discharge []  None visible  [] Abnormal -    HENT:   [x] Normocephalic, atraumatic. [] Abnormal   [x] Mouth/Throat: Mucous membranes are moist.     External Ears [x] Normal  [] Abnormal-     Neck: [x] No visualized mass     Pulmonary/Chest: [x] Respiratory effort normal.  [x] No visualized signs of difficulty breathing or respiratory distress        [] Abnormal-      Musculoskeletal:   [x] Normal gait with no signs of ataxia         [x] Normal range of motion of neck        [] Abnormal-       Neurological:        [x] No Facial Asymmetry (Cranial nerve 7 motor function) (limited exam to video visit)          [x] No gaze palsy        [] Abnormal-         Skin:        [x] No significant exanthematous lesions or discoloration noted on facial skin         [] Abnormal-            Psychiatric:       [x] Normal Affect [x] No Hallucinations        [] Abnormal-     Other pertinent observable physical exam findings-     ASSESSMENT/PLAN:    ICD-10-CM    1. Attention deficit disorder (ADD) without hyperactivity  F98.8 lisdexamfetamine (VYVANSE) 50 MG capsule     lisdexamfetamine (VYVANSE) 50 MG capsule   2.  Allergic rhinitis due to pollen, unspecified seasonality  J30.1        There are no signs of any abuse, misuse, or usage of the controlled substance in a way that is not directed. Continue treatment for ADHD and allergies    Orders Placed This Encounter   Medications    lisdexamfetamine (VYVANSE) 50 MG capsule     Sig: Take 1 capsule by mouth every morning for 30 days. Dispense:  30 capsule     Refill:  0    lisdexamfetamine (VYVANSE) 50 MG capsule     Sig: Take 1 capsule by mouth every morning for 30 days. Dispense:  30 capsule     Refill:  0       No orders of the defined types were placed in this encounter. Return in about 3 months (around 8/6/2021) for VV or OV - f/u vyvanse. Mario Moreno is a 29 y.o. male being evaluated by a Virtual Visit (video visit) encounter to address concerns as mentioned above. A caregiver was present when appropriate. Due to this being a TeleHealth encounter (During [facility]- public health emergency), evaluation of the following organ systems was limited: Vitals/Constitutional/EENT/Resp/CV/GI//MS/Neuro/Skin/Heme-Lymph-Imm. Pursuant to the emergency declaration under the [address] [place] [address], [address] [place] [address] authority and the [employer] and [employer] Act, this Virtual Visit was conducted with patient's (and/or legal guardian's) consent, to reduce the patient's risk of exposure to COVID-19 and provide necessary medical care. The patient (and/or legal guardian) has also been advised to contact this office for worsening conditions or problems, and seek emergency medical treatment and/or call 911 if deemed necessary. Services were provided through a video synchronous discussion virtually to substitute for in-person clinic visit. Patient and provider were located at their individual homes or provider at secure site for business. It is possible that material may be posted from a notepad that is used for a Dragon dictation device for dictating notes outside the EMR and I am the original author of all of this content.      --Arnold Smith MD on 5/6/2021 at 8:23 AM    An electronic signature was used to authenticate this note.

## 2021-06-14 DIAGNOSIS — F98.8 ATTENTION DEFICIT DISORDER (ADD) WITHOUT HYPERACTIVITY: ICD-10-CM

## 2021-10-11 RX ORDER — PAROXETINE HYDROCHLORIDE 20 MG/1
20 TABLET, FILM COATED ORAL EVERY MORNING
Qty: 30 TABLET | Refills: 11 | Status: SHIPPED | OUTPATIENT
Start: 2021-10-11 | End: 2022-02-21 | Stop reason: SDUPTHER

## 2021-10-11 NOTE — TELEPHONE ENCOUNTER
Rosa Mclaughlin called to request a refill on his medication.       Last office visit : 8/18/2021   Next office visit : 11/11/2021     Requested Prescriptions     Pending Prescriptions Disp Refills    PARoxetine (PAXIL) 20 MG tablet [Pharmacy Med Name: PAROXETINE HCL 20 MG TABS 20 Tablet] 30 tablet 11     Sig: TAKE 1 TABLET BY MOUTH EVERY MORNING            Arthur Covarrubias

## 2021-11-11 ENCOUNTER — VIRTUAL VISIT (OUTPATIENT)
Dept: PRIMARY CARE CLINIC | Age: 36
End: 2021-11-11
Payer: COMMERCIAL

## 2021-11-11 DIAGNOSIS — F17.210 CIGARETTE SMOKER: ICD-10-CM

## 2021-11-11 DIAGNOSIS — F32.4 MAJOR DEPRESSIVE DISORDER WITH SINGLE EPISODE, IN PARTIAL REMISSION (HCC): ICD-10-CM

## 2021-11-11 DIAGNOSIS — F98.8 ATTENTION DEFICIT DISORDER (ADD) WITHOUT HYPERACTIVITY: Primary | ICD-10-CM

## 2021-11-11 PROCEDURE — 99214 OFFICE O/P EST MOD 30 MIN: CPT | Performed by: FAMILY MEDICINE

## 2021-11-11 RX ORDER — BUPROPION HYDROCHLORIDE 100 MG/1
TABLET, EXTENDED RELEASE ORAL
Qty: 60 TABLET | Refills: 1 | Status: SHIPPED | OUTPATIENT
Start: 2021-11-11 | End: 2022-01-13 | Stop reason: SDUPTHER

## 2021-11-11 ASSESSMENT — ENCOUNTER SYMPTOMS
VOMITING: 0
DIARRHEA: 0
CHEST TIGHTNESS: 0
COUGH: 0
ABDOMINAL PAIN: 0
NAUSEA: 0
SHORTNESS OF BREATH: 0
WHEEZING: 0
CONSTIPATION: 0

## 2021-11-11 NOTE — PROGRESS NOTES
2021    TELEHEALTH EVALUATION -- Audio/Visual (During [facility] public health emergency)    HPI:    Kei Cruz (:  1985) has requested an audio/video evaluation for the following concern(s):    Patient presents today via video visit for f/u ADHD. He is on vyvanse 50 mg daily. He is starting to feel that he is not noting it being strong enough. Notes focus issue. Denies any depression or anxiety. He is wondering about also possibly adding bupropion as we discussed it previously. I did bring this up to him as an alternative for ADHD treatment and he is interested in as he would like to also try to quit smoking. Review of Systems   Constitutional: Negative for chills and fever. Respiratory: Negative for cough, chest tightness, shortness of breath and wheezing. Cardiovascular: Negative for chest pain, palpitations and leg swelling. Gastrointestinal: Negative for abdominal pain, constipation, diarrhea, nausea and vomiting. Genitourinary: Negative for difficulty urinating, dysuria and frequency. Psychiatric/Behavioral: Positive for decreased concentration. Negative for dysphoric mood. The patient is not nervous/anxious. Prior to Visit Medications    Medication Sig Taking? Authorizing Provider   Lisdexamfetamine Dimesylate (VYVANSE) 60 MG CAPS Take 60 mg by mouth every morning for 30 days. Yes Judi Soto MD   Lisdexamfetamine Dimesylate (VYVANSE) 60 MG CAPS Take 60 mg by mouth every morning for 30 days. Yes Judi Soto MD   PARoxetine (PAXIL) 20 MG tablet TAKE 1 TABLET BY MOUTH EVERY MORNING  Judi Soto MD   fluticasone [facility]) 50 MCG/ACT nasal spray 1 spray by Nasal route daily  Judi Soto MD   fexofenadine TY [facility]) 180 MG tablet Take 1 tablet by mouth daily  Judi Soto MD   naproxen (NAPROSYN) 500 MG tablet Take 1 tablet by mouth 2 times daily (with meals)  Judi Soto MD   Compression Sleeves R elbow compression sleeve  Dx:   Tendonitis, right upper extremity  Nataly Reich MD   nicotine (NICODERM CQ) 21 MG/24HR Place 1 patch onto the skin every 24 hours  Nataly Reich MD   Multiple Vitamin (MULTI VITAMIN DAILY) TABS Take 1 tablet by mouth daily  Tonia Recinos MD       Social History     Tobacco Use    Smoking status: Current Every Day Smoker     Packs/day: 0.50    Smokeless tobacco: Never Used   Vaping Use    Vaping Use: Never used   Substance Use Topics    Alcohol use: Yes     Alcohol/week: 3.0 - 4.0 standard drinks     Types: 3 - 4 Cans of beer per week     Comment: occassionally    Drug use: No        No Known Allergies,   Past Medical History:   Diagnosis Date    Asthma     as a child    Enlarged prostate     Hemorrhoid     Kidney stones     Rectal bleed    ,   Past Surgical History:   Procedure Laterality Date    COLONOSCOPY N/A 5/25/2016    Dr Flash Pizarro internal hemorrhoids, HP x 3    MOUTH SURGERY      Tooth extraction    WISDOM TOOTH EXTRACTION     ,   Social History     Tobacco Use    Smoking status: Current Every Day Smoker     Packs/day: 0.50    Smokeless tobacco: Never Used   Vaping Use    Vaping Use: Never used   Substance Use Topics    Alcohol use:  Yes     Alcohol/week: 3.0 - 4.0 standard drinks     Types: 3 - 4 Cans of beer per week     Comment: occassionally    Drug use: No   ,   Family History   Problem Relation Age of Onset    Breast Cancer Mother     Colon Polyps Father     Colon Cancer Paternal Grandmother     Colon Polyps Paternal Grandmother     Esophageal Cancer Neg Hx     Liver Cancer Neg Hx     Liver Disease Neg Hx     Stomach Cancer Neg Hx     Rectal Cancer Neg Hx    ,   Immunization History   Administered Date(s) Administered    Hepatitis B 08/04/2008    Influenza Vaccine, unspecified formulation 10/10/2016, 11/15/2017    Influenza Virus Vaccine 10/06/2014    Influenza Whole 10/22/2015    Pneumococcal Polysaccharide ([id number]) 06/05/2018    Tdap (Boostrix, Adacel) 03/03/2016    Tetanus 03/03/2016   ,   Health Maintenance   Topic Date Due    Varicella vaccine (1 of 2 - 2-dose childhood series) Never done    COVID-19 Vaccine (1) Never done    Flu vaccine (1) 09/01/2021    DTaP/Tdap/Td vaccine (2 - Td or Tdap) 03/03/2026    Pneumococcal 0-64 years Vaccine (2 of 2 - PPSV23) 02/23/2051    Hepatitis C screen  Completed    HIV screen  Completed    Hepatitis A vaccine  Aged Out    Hepatitis B vaccine  Aged Out    Hib vaccine  Aged Out    Meningococcal (ACWY) vaccine  Aged Out       PHYSICAL EXAMINATION:  [ INSTRUCTIONS:  \"[x]\" Indicates a positive item  \"[]\" Indicates a negative item  -- DELETE ALL ITEMS NOT EXAMINED]  Vital Signs: (As obtained by patient/caregiver or practitioner observation)    Blood pressure-  Heart rate-    Respiratory rate-    Temperature-  Pulse oximetry-     Constitutional: [x] Appears well-developed and well-nourished [] No apparent distress      [] Abnormal-   Mental status  [x] Alert and awake  [x] Oriented to person/place/time [x]Able to follow commands      Eyes:  EOM    [x]  Normal  [] Abnormal-  Sclera  [x]  Normal  [] Abnormal -         Discharge []  None visible  [] Abnormal -    HENT:   [x] Normocephalic, atraumatic.   [] Abnormal   [x] Mouth/Throat: Mucous membranes are moist.     External Ears [x] Normal  [] Abnormal-     Neck: [x] No visualized mass     Pulmonary/Chest: [x] Respiratory effort normal.  [x] No visualized signs of difficulty breathing or respiratory distress        [] Abnormal-      Musculoskeletal:   [x] Normal gait with no signs of ataxia         [x] Normal range of motion of neck        [] Abnormal-       Neurological:        [x] No Facial Asymmetry (Cranial nerve 7 motor function) (limited exam to video visit)          [x] No gaze palsy        [] Abnormal-         Skin:        [x] No significant exanthematous lesions or discoloration noted on facial skin         [] Abnormal-            Psychiatric:       [x] Normal Affect [x] No Hallucinations        [] Abnormal-     Other pertinent observable physical exam findings-     ASSESSMENT/PLAN:    ICD-10-CM    1. Attention deficit disorder (ADD) without hyperactivity  F98.8 Lisdexamfetamine Dimesylate (VYVANSE) 60 MG CAPS     Lisdexamfetamine Dimesylate (VYVANSE) 60 MG CAPS   2. Major depressive disorder with single episode, in partial remission ([facility] [address]  F32.4    3. Cigarette smoker  F17.210        Titrate ADHD as it is uncontrolled by adding bupropion. Also patient received tobacco counseling as this may help with this. Goal is possibly to see some improvement without having increased Vyvanse prescription. We may even consider tapering down if he is doing well on the Vyvanse dosage in the future. Patient is also continues current anxiety and depression medicines as they are working well    Orders Placed This Encounter   Medications    Lisdexamfetamine Dimesylate (VYVANSE) 60 MG CAPS     Sig: Take 60 mg by mouth every morning for 30 days. Dispense:  30 capsule     Refill:  0    Lisdexamfetamine Dimesylate (VYVANSE) 60 MG CAPS     Sig: Take 60 mg by mouth every morning for 30 days. Dispense:  30 capsule     Refill:  0       No orders of the defined types were placed in this encounter. Return in about 3 months (around 2/11/2022) for ov, annual 2 time slots, f/u , POCT UDS. Michael Leahy is a 29 y.o. male being evaluated by a Virtual Visit (video visit) encounter to address concerns as mentioned above. A caregiver was present when appropriate. Due to this being a TeleHealth encounter (During [facility]-22 public health emergency), evaluation of the following organ systems was limited: Vitals/Constitutional/EENT/Resp/CV/GI//MS/Neuro/Skin/Heme-Lymph-Imm.   Pursuant to the emergency declaration under the [facility] waiver authority and the [employer] and [employer] Act, this Virtual Visit was conducted with patient's (and/or legal guardian's) consent, to reduce the patient's risk of exposure to COVID-19 and provide necessary medical care. The patient (and/or legal guardian) has also been advised to contact this office for worsening conditions or problems, and seek emergency medical treatment and/or call 911 if deemed necessary. Services were provided through a video synchronous discussion virtually to substitute for in-person clinic visit. Patient and provider were located at their individual homes or provider at secure site for business. It is possible that material may be posted from a notepad that is used for a Dragon dictation device for dictating notes outside the EMR and I am the original author of all of this content. --Christian Garcia MD on 11/11/2021 at 10:39 AM    An electronic signature was used to authenticate this note.

## 2022-01-13 DIAGNOSIS — F98.8 ATTENTION DEFICIT DISORDER (ADD) WITHOUT HYPERACTIVITY: ICD-10-CM

## 2022-01-13 RX ORDER — BUPROPION HYDROCHLORIDE 100 MG/1
TABLET, EXTENDED RELEASE ORAL
Qty: 180 TABLET | Refills: 1 | Status: SHIPPED | OUTPATIENT
Start: 2022-01-13 | End: 2022-03-03

## 2022-01-14 RX ORDER — LISDEXAMFETAMINE DIMESYLATE 50 MG
CAPSULE ORAL
Qty: 30 CAPSULE | Refills: 0 | OUTPATIENT
Start: 2022-01-14

## 2022-02-21 DIAGNOSIS — F98.8 ATTENTION DEFICIT DISORDER (ADD) WITHOUT HYPERACTIVITY: ICD-10-CM

## 2022-02-21 RX ORDER — PAROXETINE HYDROCHLORIDE 20 MG/1
20 TABLET, FILM COATED ORAL EVERY MORNING
Qty: 90 TABLET | Refills: 0 | Status: SHIPPED | OUTPATIENT
Start: 2022-02-21 | End: 2022-07-14

## 2022-02-21 NOTE — TELEPHONE ENCOUNTER
Bethany Hirscholiviernoir called to request a refill on his medication. Last office visit : 11/11/2021   Next office visit : 3/3/2022     Last UDS:   Amphetamine Screen, Urine   Date Value Ref Range Status   02/03/2021 (+)  Final     Barbiturate Screen, Urine   Date Value Ref Range Status   02/03/2021 -  Final     Benzodiazepine Screen, Urine   Date Value Ref Range Status   02/03/2021 -  Final     Buprenorphine Urine   Date Value Ref Range Status   02/03/2021 -  Final     Cocaine Metabolite Screen, Urine   Date Value Ref Range Status   02/03/2021 -  Final     Gabapentin Screen, Urine   Date Value Ref Range Status   02/03/2021 -  Final     MDMA, Urine   Date Value Ref Range Status   02/03/2021 -  Final     Methamphetamine, Urine   Date Value Ref Range Status   02/03/2021 -  Final     Opiate Scrn, Ur   Date Value Ref Range Status   02/03/2021 -  Final     Oxycodone Screen, Ur   Date Value Ref Range Status   02/03/2021 -  Final     PCP Screen, Urine   Date Value Ref Range Status   02/03/2021 -  Final     Propoxyphene Screen, Urine   Date Value Ref Range Status   02/03/2021 -  Final     THC Screen, Urine   Date Value Ref Range Status   02/03/2021 -  Final     Tricyclic Antidepressants, Urine   Date Value Ref Range Status   02/03/2021 -  Final             Last Lamona Glaze: 1/14/2022  Medication Contract: 3/3/2016  Last Fill: 1/17/2022    Requested Prescriptions     Pending Prescriptions Disp Refills    lisdexamfetamine (VYVANSE) 50 MG capsule 30 capsule 0     Sig: Take 1 capsule by mouth every morning for 30 days. Please approve or refuse this medication.    Anil Quinones

## 2022-03-03 ENCOUNTER — OFFICE VISIT (OUTPATIENT)
Dept: PRIMARY CARE CLINIC | Age: 37
End: 2022-03-03
Payer: COMMERCIAL

## 2022-03-03 VITALS
RESPIRATION RATE: 18 BRPM | HEIGHT: 68 IN | BODY MASS INDEX: 24.25 KG/M2 | DIASTOLIC BLOOD PRESSURE: 80 MMHG | OXYGEN SATURATION: 98 % | TEMPERATURE: 97.4 F | SYSTOLIC BLOOD PRESSURE: 120 MMHG | WEIGHT: 160 LBS | HEART RATE: 81 BPM

## 2022-03-03 DIAGNOSIS — J01.90 ACUTE BACTERIAL SINUSITIS: ICD-10-CM

## 2022-03-03 DIAGNOSIS — Z00.00 ANNUAL PHYSICAL EXAM: Primary | ICD-10-CM

## 2022-03-03 DIAGNOSIS — Z79.899 DRUG THERAPY: ICD-10-CM

## 2022-03-03 DIAGNOSIS — F98.8 ATTENTION DEFICIT DISORDER (ADD) WITHOUT HYPERACTIVITY: ICD-10-CM

## 2022-03-03 DIAGNOSIS — B96.89 ACUTE BACTERIAL SINUSITIS: ICD-10-CM

## 2022-03-03 DIAGNOSIS — Z00.00 ENCOUNTER FOR WELL ADULT EXAM WITHOUT ABNORMAL FINDINGS: ICD-10-CM

## 2022-03-03 DIAGNOSIS — F51.5 BAD DREAMS: ICD-10-CM

## 2022-03-03 PROCEDURE — 99213 OFFICE O/P EST LOW 20 MIN: CPT | Performed by: FAMILY MEDICINE

## 2022-03-03 PROCEDURE — 80305 DRUG TEST PRSMV DIR OPT OBS: CPT | Performed by: FAMILY MEDICINE

## 2022-03-03 PROCEDURE — 99395 PREV VISIT EST AGE 18-39: CPT | Performed by: FAMILY MEDICINE

## 2022-03-03 RX ORDER — AMOXICILLIN AND CLAVULANATE POTASSIUM 875; 125 MG/1; MG/1
1 TABLET, FILM COATED ORAL 2 TIMES DAILY
Qty: 20 TABLET | Refills: 0 | Status: SHIPPED | OUTPATIENT
Start: 2022-03-03 | End: 2022-03-13

## 2022-03-03 RX ORDER — BUPROPION HYDROCHLORIDE 150 MG/1
150 TABLET ORAL EVERY MORNING
Qty: 90 TABLET | Refills: 3 | Status: SHIPPED | OUTPATIENT
Start: 2022-03-03

## 2022-03-03 ASSESSMENT — PATIENT HEALTH QUESTIONNAIRE - PHQ9
SUM OF ALL RESPONSES TO PHQ QUESTIONS 1-9: 0
1. LITTLE INTEREST OR PLEASURE IN DOING THINGS: 0
SUM OF ALL RESPONSES TO PHQ9 QUESTIONS 1 & 2: 0
SUM OF ALL RESPONSES TO PHQ QUESTIONS 1-9: 0
2. FEELING DOWN, DEPRESSED OR HOPELESS: 0

## 2022-03-03 NOTE — PROGRESS NOTES
Bethany Valencia is a 40 y.o. male who presents today for   Chief Complaint   Patient presents with    Annual Exam     Pt states he has no concerns        HPI   Patient is here for 2 separate visits: annual wellness visit as well as acute and chronic issues. The below review of systems and physical exam applies to both encounters. Annual HPI:  Patient is here for annual physical exam.  Patient denies any major changes in recent screenings or family medical history changes. Acute/Chronic HPI:  Patient is here for concern of congestion and cough for greater than 7 days. Not improving. His ADHD medication at this time. No change in PMH, family, social, or surgical history unless mentioned above. I have reviewed the above chief complaint and HPI details charted by staff and claim ownership of the documentation. Review of Systems   Constitutional: Positive for fatigue. Negative for chills and fever. HENT: Positive for congestion, rhinorrhea, sneezing and sore throat. Respiratory: Positive for cough. Negative for chest tightness, shortness of breath and wheezing. Cardiovascular: Negative for chest pain, palpitations and leg swelling. Gastrointestinal: Negative for abdominal pain, constipation, diarrhea, nausea and vomiting. Genitourinary: Negative for difficulty urinating, dysuria and frequency. Past Medical History:   Diagnosis Date    Asthma     as a child    Enlarged prostate     Hemorrhoid     Kidney stones     Rectal bleed        Current Outpatient Medications   Medication Sig Dispense Refill    lisdexamfetamine (VYVANSE) 50 MG capsule Take 1 capsule by mouth every morning for 30 days.  30 capsule 0    buPROPion (WELLBUTRIN XL) 150 MG extended release tablet Take 1 tablet by mouth every morning 90 tablet 3    PARoxetine (PAXIL) 20 MG tablet Take 1 tablet by mouth every morning 90 tablet 0    fluticasone (FLONASE) 50 MCG/ACT nasal spray 1 spray by Nasal route daily 1 Bottle 11    fexofenadine (ALLEGRA) 180 MG tablet Take 1 tablet by mouth daily 90 tablet 3    naproxen (NAPROSYN) 500 MG tablet Take 1 tablet by mouth 2 times daily (with meals) 60 tablet 11    Compression Sleeves R elbow compression sleeve  Dx: Tendonitis, right upper extremity 1 each 0    nicotine (NICODERM CQ) 21 MG/24HR Place 1 patch onto the skin every 24 hours 30 patch 1    Multiple Vitamin (MULTI VITAMIN DAILY) TABS Take 1 tablet by mouth daily 30 tablet 5    lisdexamfetamine (VYVANSE) 50 MG capsule Take 1 capsule by mouth every morning for 30 days. 30 capsule 0     No current facility-administered medications for this visit. No Known Allergies    Past Surgical History:   Procedure Laterality Date    COLONOSCOPY N/A 5/25/2016    Dr Pradeep Huang internal hemorrhoids, HP x 3    MOUTH SURGERY      Tooth extraction    WISDOM TOOTH EXTRACTION         Social History     Tobacco Use    Smoking status: Current Every Day Smoker     Packs/day: 0.50    Smokeless tobacco: Never Used   Vaping Use    Vaping Use: Never used   Substance Use Topics    Alcohol use: Yes     Alcohol/week: 3.0 - 4.0 standard drinks     Types: 3 - 4 Cans of beer per week     Comment: occassionally    Drug use: No       Family History   Problem Relation Age of Onset    Breast Cancer Mother     Colon Polyps Father     Colon Cancer Paternal Grandmother     Colon Polyps Paternal Grandmother     Esophageal Cancer Neg Hx     Liver Cancer Neg Hx     Liver Disease Neg Hx     Stomach Cancer Neg Hx     Rectal Cancer Neg Hx        /80 (Site: Left Upper Arm, Position: Sitting)   Pulse 81   Temp 97.4 °F (36.3 °C) (Temporal)   Resp 18   Ht 5' 8\" (1.727 m)   Wt 160 lb (72.6 kg)   SpO2 98%   BMI 24.33 kg/m²     Physical Exam  Vitals and nursing note reviewed. Constitutional:       General: He is not in acute distress. Appearance: He is well-developed. He is not toxic-appearing or diaphoretic.    HENT:      Head: Normocephalic and atraumatic. Not macrocephalic and not microcephalic. Right Ear: Hearing, tympanic membrane, ear canal and external ear normal. No decreased hearing noted. No drainage. Left Ear: Hearing, tympanic membrane, ear canal and external ear normal. No decreased hearing noted. No drainage. Nose: Mucosal edema and rhinorrhea present. No nasal deformity. Mouth/Throat:      Mouth: Mucous membranes are not pale and not dry. Pharynx: Uvula midline. Posterior oropharyngeal erythema present. No oropharyngeal exudate. Eyes:      General: Lids are normal. No scleral icterus. Right eye: No discharge. Left eye: No discharge. Conjunctiva/sclera: Conjunctivae normal.      Pupils: Pupils are equal, round, and reactive to light. Neck:      Thyroid: No thyromegaly. Trachea: Phonation normal. No tracheal deviation. Cardiovascular:      Rate and Rhythm: Normal rate and regular rhythm. No extrasystoles are present. Heart sounds: Normal heart sounds, S1 normal and S2 normal. No murmur heard. Pulmonary:      Effort: Pulmonary effort is normal. No respiratory distress. Breath sounds: Normal breath sounds. No wheezing, rhonchi or rales. Chest:   Breasts:      Right: No supraclavicular adenopathy. Left: No supraclavicular adenopathy. Abdominal:      General: Bowel sounds are normal. There is no distension. Palpations: Abdomen is soft. Tenderness: There is no abdominal tenderness. There is no guarding. Musculoskeletal:         General: No tenderness. Normal range of motion. Cervical back: Normal range of motion and neck supple. No tenderness or bony tenderness. Thoracic back: Normal. No tenderness or bony tenderness. Lumbar back: Normal. No tenderness or bony tenderness. Right lower leg: No swelling. No edema. Left lower leg: No swelling. No edema.    Lymphadenopathy:      Head:      Right side of head: No submental, submandibular or tonsillar adenopathy. Left side of head: No submental, submandibular or tonsillar adenopathy. Cervical: No cervical adenopathy. Upper Body:      Right upper body: No supraclavicular adenopathy. Left upper body: No supraclavicular adenopathy. Skin:     General: Skin is dry. Coloration: Skin is not pale. Findings: No erythema (on head, neck, face, extremities) or rash (on extremities, head, neck, face). Nails: There is no clubbing. Neurological:      Mental Status: He is alert and oriented to person, place, and time. Motor: No tremor or seizure activity. Gait: Gait normal.      Deep Tendon Reflexes: Reflexes are normal and symmetric. Psychiatric:         Speech: Speech normal.         Behavior: Behavior normal.         Thought Content: Thought content normal.         Judgment: Judgment normal.         Assessment:    ICD-10-CM    1. Annual physical exam  Z00.00    2. Drug therapy  Z79.899 POCT Rapid Drug Screen   3. Encounter for well adult exam without abnormal findings  Z00.00    4. Attention deficit disorder (ADD) without hyperactivity  F98.8 lisdexamfetamine (VYVANSE) 50 MG capsule   5. Bad dreams  F51.5    6. Acute bacterial sinusitis  J01.90     B96.89        Plan:   Plan #1: Annual exam  I have reviewed and assessed the patient's current health status, risk factors, and progress for available preventative care. Plan #2: Acute and chronic conditions  Acute treatment for sinusitis, other conditions controlled  Orders Placed This Encounter   Procedures    POCT Rapid Drug Screen     Orders Placed This Encounter   Medications    lisdexamfetamine (VYVANSE) 50 MG capsule     Sig: Take 1 capsule by mouth every morning for 30 days.      Dispense:  30 capsule     Refill:  0     Please cancel the 60 mg vyvanse Rxs    buPROPion (WELLBUTRIN XL) 150 MG extended release tablet     Sig: Take 1 tablet by mouth every morning     Dispense:  90 tablet     Refill:  3    amoxicillin-clavulanate (AUGMENTIN) 875-125 MG per tablet     Sig: Take 1 tablet by mouth 2 times daily for 10 days     Dispense:  20 tablet     Refill:  0     Medications Discontinued During This Encounter   Medication Reason    lisdexamfetamine (VYVANSE) 50 MG capsule REORDER    buPROPion (WELLBUTRIN SR) 100 MG extended release tablet LIST CLEANUP     There are no Patient Instructions on file for this visit. Patient given educational handouts and has had all questions answered. Patient voices understanding and agrees to plans along with risks and benefits of plan. Patient isinstructed to continue prior meds, diet, and exercise plans unless instructed otherwise. Patient agrees to follow up as instructed and sooner if needed. Patient agrees to go to ER if condition becomes emergent. Notesmay be completed with dictation device and spelling errors may occur. Materials may be copied and pasted from a notepad outside of EMR, all of which, I, Dr. Loco Watts MD, take sole intellectual ownership of and have approved adding to my note. Return in about 3 months (around 6/3/2022) for ov, ADD.

## 2022-03-19 ASSESSMENT — ENCOUNTER SYMPTOMS
CHEST TIGHTNESS: 0
WHEEZING: 0
CONSTIPATION: 0
COUGH: 1
NAUSEA: 0
SHORTNESS OF BREATH: 0
DIARRHEA: 0
SORE THROAT: 1
ABDOMINAL PAIN: 0
RHINORRHEA: 1
VOMITING: 0

## 2022-04-01 RX ORDER — FEXOFENADINE HYDROCHLORIDE 180 MG/1
TABLET, FILM COATED ORAL
Qty: 90 TABLET | Refills: 3 | Status: SHIPPED | OUTPATIENT
Start: 2022-04-01

## 2022-04-01 RX ORDER — FLUTICASONE PROPIONATE 50 MCG
SPRAY, SUSPENSION (ML) NASAL
Qty: 16 G | Refills: 3 | Status: SHIPPED | OUTPATIENT
Start: 2022-04-01

## 2022-04-01 NOTE — TELEPHONE ENCOUNTER
Salbador Rey called to request a refill on his medication.       Last office visit : 3/3/2022   Next office visit : 6/6/2022     Requested Prescriptions     Signed Prescriptions Disp Refills    ALLERGY RELIEF 180 MG tablet 90 tablet 3     Sig: TAKE ONE TABLET BY MOUTH ONCE DAILY     Authorizing Provider: Jaxson Patel     Ordering User: Carlos Vasquez    fluticasone (FLONASE) 50 MCG/ACT nasal spray 16 g 3     Sig: PUT ONE SPRAY IN [facility] NOSTIL ONCE DAILY     Authorizing Provider: Jaxson Patel     Ordering User: Maxx Call MA

## 2022-04-29 DIAGNOSIS — F98.8 ATTENTION DEFICIT DISORDER (ADD) WITHOUT HYPERACTIVITY: ICD-10-CM

## 2022-05-02 RX ORDER — LISDEXAMFETAMINE DIMESYLATE 50 MG
CAPSULE ORAL
Qty: 30 CAPSULE | Refills: 0 | OUTPATIENT
Start: 2022-05-02

## 2022-05-04 DIAGNOSIS — F98.8 ATTENTION DEFICIT DISORDER (ADD) WITHOUT HYPERACTIVITY: ICD-10-CM

## 2022-05-05 RX ORDER — LISDEXAMFETAMINE DIMESYLATE 50 MG
CAPSULE ORAL
Qty: 30 CAPSULE | Refills: 0 | OUTPATIENT
Start: 2022-05-05

## 2022-06-06 ENCOUNTER — TELEPHONE (OUTPATIENT)
Dept: PRIMARY CARE CLINIC | Age: 37
End: 2022-06-06

## 2022-06-10 DIAGNOSIS — F98.8 ATTENTION DEFICIT DISORDER (ADD) WITHOUT HYPERACTIVITY: ICD-10-CM

## 2022-06-10 NOTE — TELEPHONE ENCOUNTER
Millicent Gil called to request a refill on his medication. I found a note in chart that patient called on 6/6/22 stated  he needed a refill on this medication. i'm not sure what happened to the note but he has called again today saying his is out of medication. I told him it can take up to 7days. That he didn't give us a 7 day notice and Dr. Frank Tyler isn't in the office until Monday. Last office visit : 3/3/2022   Next office visit : 6/14/2022     Last Rosanne aFir: 6/10/22  Medication Contract: 3/3/22  Last UDS: 3/3/22  Last Rx: 5/5/22          Amphetamine Screen, Urine   Date Value Ref Range Status   03/03/2022 +  Final     Comment:     Vyvanse      Barbiturate Screen, Urine   Date Value Ref Range Status   03/03/2022 -  Final     Benzodiazepine Screen, Urine   Date Value Ref Range Status   03/03/2022 -  Final     Buprenorphine Urine   Date Value Ref Range Status   03/03/2022 -  Final     Cocaine Metabolite Screen, Urine   Date Value Ref Range Status   03/03/2022 -  Final     Gabapentin Screen, Urine   Date Value Ref Range Status   03/03/2022 -  Final     MDMA, Urine   Date Value Ref Range Status   03/03/2022 -  Final     Methamphetamine, Urine   Date Value Ref Range Status   03/03/2022 -  Final     Opiate Scrn, Ur   Date Value Ref Range Status   03/03/2022 -  Final     Oxycodone Screen, Ur   Date Value Ref Range Status   03/03/2022 -  Final     PCP Screen, Urine   Date Value Ref Range Status   03/03/2022 -  Final     Propoxyphene Screen, Urine   Date Value Ref Range Status   03/03/2022 -  Final     THC Screen, Urine   Date Value Ref Range Status   03/03/2022 -  Final     Tricyclic Antidepressants, Urine   Date Value Ref Range Status   03/03/2022 -  Final           Requested Prescriptions     Pending Prescriptions Disp Refills    lisdexamfetamine (VYVANSE) 50 MG capsule 30 capsule 0     Sig: Take 1 capsule by mouth every morning for 30 days. Please approve or refuse this medication.    Taiwo Drop, [address]

## 2022-06-14 ENCOUNTER — TELEMEDICINE (OUTPATIENT)
Dept: PRIMARY CARE CLINIC | Age: 37
End: 2022-06-14
Payer: COMMERCIAL

## 2022-06-14 DIAGNOSIS — F32.4 MAJOR DEPRESSIVE DISORDER WITH SINGLE EPISODE, IN PARTIAL REMISSION (HCC): ICD-10-CM

## 2022-06-14 DIAGNOSIS — U07.1 COVID-19: ICD-10-CM

## 2022-06-14 DIAGNOSIS — F98.8 ATTENTION DEFICIT DISORDER (ADD) WITHOUT HYPERACTIVITY: Primary | ICD-10-CM

## 2022-06-14 PROCEDURE — 99442 PR PHYS/QHP TELEPHONE EVALUATION 11-20 MIN: CPT | Performed by: STUDENT IN AN ORGANIZED HEALTH CARE EDUCATION/TRAINING PROGRAM

## 2022-06-14 RX ORDER — AZELASTINE 1 MG/ML
2 SPRAY, METERED NASAL 2 TIMES DAILY
Qty: 60 ML | Refills: 5 | Status: SHIPPED | OUTPATIENT
Start: 2022-06-14

## 2022-06-14 NOTE — PROGRESS NOTES
Ishan Raman is a 40 y.o. male evaluated via telephone on 6/14/2022 for No chief complaint on file. .        Documentation:  I communicated with the patient and/or health care decision maker about     ADHD  -Here for regular follow up. On vyvanse 50mg. Has been on this for a while. No adverse s/e (palpitations, cp, unintended wt loss or sleep intrustion)    COVID-19  -Recent infection diagnosed around 06/06. Pt has been clinically improving. Does mention some sinus/nose congestion and wondering what he can take. Depression  -On paxil and wellbutrin for this. He feels symptoms are controlled. Details of this discussion including any medical advice provided:     ADHD  -Continue vyvanse 50mg. PRITI was reviewed today per office protocol. Report shows No discrepancies. Fill pattern is consistent from single provider(s) at single pharmacy(s). Presciption Escribed  -UDS up to date    COVID-19  -Improving. Discussed mostly supportive care. Can take azelastin, will send in rx. Advised to follow up with me if not improving in next 2 weeks. Depression  -Continue paxil and wellbutrin     Total Time: minutes: 11-20 minutes    Ishan Raman was evaluated through a synchronous (real-time) audio encounter. Patient identification was verified at the start of the visit. He (or guardian if applicable) is aware that this is a billable service, which includes applicable co-pays. This visit was conducted with the patient's (and/or legal guardian's) verbal consent. He has not had a related appointment within my department in the past 7 days or scheduled within the next 24 hours. The patient was located at Home: [address] [place] [address]. The provider was located at [place] [address]): [address] [place] [address]  [place],  [place] .     Note: not billable if this call serves to triage the patient into an appointment for the relevant concern    Kimmy Lopez MD

## 2022-07-14 RX ORDER — PAROXETINE HYDROCHLORIDE 20 MG/1
20 TABLET, FILM COATED ORAL EVERY MORNING
Qty: 90 TABLET | Refills: 0 | Status: SHIPPED | OUTPATIENT
Start: 2022-07-14 | End: 2022-11-03

## 2022-07-14 NOTE — TELEPHONE ENCOUNTER
Sebastián Eye called requesting a refill of the below medication which has been pended for you:     Requested Prescriptions     Pending Prescriptions Disp Refills    PARoxetine (PAXIL) 20 MG tablet [Pharmacy Med Name: PAROXETINE HCL 20 MG TABS 20 Tablet] 90 tablet 0     Sig: TAKE 1 TABLET BY MOUTH EVERY MORNING       Last Appointment Date: 6/14/2022  Next Appointment Date: 9/14/2022    No Known Allergies

## 2022-08-19 DIAGNOSIS — F98.8 ATTENTION DEFICIT DISORDER (ADD) WITHOUT HYPERACTIVITY: ICD-10-CM

## 2022-08-19 NOTE — TELEPHONE ENCOUNTER
Sintia Carroll called to request a refill on his medication. Last office visit : 6/14/2022   Next office visit : 9/14/2022     Last UDS:   Amphetamine Screen, Urine   Date Value Ref Range Status   03/03/2022 +  Final     Comment:     Vyvanse      Barbiturate Screen, Urine   Date Value Ref Range Status   03/03/2022 -  Final     Benzodiazepine Screen, Urine   Date Value Ref Range Status   03/03/2022 -  Final     Buprenorphine Urine   Date Value Ref Range Status   03/03/2022 -  Final     Cocaine Metabolite Screen, Urine   Date Value Ref Range Status   03/03/2022 -  Final     Gabapentin Screen, Urine   Date Value Ref Range Status   03/03/2022 -  Final     MDMA, Urine   Date Value Ref Range Status   03/03/2022 -  Final     Methamphetamine, Urine   Date Value Ref Range Status   03/03/2022 -  Final     Opiate Scrn, Ur   Date Value Ref Range Status   03/03/2022 -  Final     Oxycodone Screen, Ur   Date Value Ref Range Status   03/03/2022 -  Final     PCP Screen, Urine   Date Value Ref Range Status   03/03/2022 -  Final     Propoxyphene Screen, Urine   Date Value Ref Range Status   03/03/2022 -  Final     THC Screen, Urine   Date Value Ref Range Status   03/03/2022 -  Final     Tricyclic Antidepressants, Urine   Date Value Ref Range Status   03/03/2022 -  Final       Last Cailin Boop: 6/10/22  Medication Contract: 3/3/22   Last Fill: 7/11/22    Requested Prescriptions     Pending Prescriptions Disp Refills    lisdexamfetamine (VYVANSE) 50 MG capsule 30 capsule 0     Sig: Take 1 capsule by mouth every morning for 30 days. Please approve or refuse this medication.    [employer])

## 2022-09-14 ENCOUNTER — OFFICE VISIT (OUTPATIENT)
Dept: PRIMARY CARE CLINIC | Age: 37
End: 2022-09-14
Payer: COMMERCIAL

## 2022-09-14 VITALS
WEIGHT: 160 LBS | TEMPERATURE: 98.2 F | HEART RATE: 90 BPM | SYSTOLIC BLOOD PRESSURE: 118 MMHG | RESPIRATION RATE: 20 BRPM | HEIGHT: 68 IN | DIASTOLIC BLOOD PRESSURE: 80 MMHG | OXYGEN SATURATION: 98 % | BODY MASS INDEX: 24.25 KG/M2

## 2022-09-14 DIAGNOSIS — F98.8 ATTENTION DEFICIT DISORDER (ADD) WITHOUT HYPERACTIVITY: Primary | ICD-10-CM

## 2022-09-14 LAB
ALCOHOL URINE: NORMAL
AMPHETAMINE SCREEN, URINE: POSITIVE
BARBITURATE SCREEN, URINE: NORMAL
BENZODIAZEPINE SCREEN, URINE: NORMAL
BUPRENORPHINE URINE: NORMAL
COCAINE METABOLITE SCREEN URINE: NORMAL
FENTANYL SCREEN, URINE: NORMAL
GABAPENTIN SCREEN, URINE: NORMAL
MDMA URINE: NORMAL
METHADONE SCREEN, URINE: NORMAL
METHAMPHETAMINE, URINE: NORMAL
OPIATE SCREEN URINE: NORMAL
OXYCODONE SCREEN URINE: NORMAL
PHENCYCLIDINE SCREEN URINE: NORMAL
PROPOXYPHENE SCREEN, URINE: NORMAL
SYNTHETIC CANNABINOIDS(K2) SCREEN, URINE: NORMAL
THC SCREEN, URINE: NORMAL
TRAMADOL SCREEN URINE: NORMAL
TRICYCLIC ANTIDEPRESSANTS, UR: NORMAL

## 2022-09-14 PROCEDURE — 99214 OFFICE O/P EST MOD 30 MIN: CPT | Performed by: STUDENT IN AN ORGANIZED HEALTH CARE EDUCATION/TRAINING PROGRAM

## 2022-09-14 PROCEDURE — 80305 DRUG TEST PRSMV DIR OPT OBS: CPT | Performed by: STUDENT IN AN ORGANIZED HEALTH CARE EDUCATION/TRAINING PROGRAM

## 2022-09-14 RX ORDER — DEXTROAMPHETAMINE SACCHARATE, AMPHETAMINE ASPARTATE MONOHYDRATE, DEXTROAMPHETAMINE SULFATE AND AMPHETAMINE SULFATE 5; 5; 5; 5 MG/1; MG/1; MG/1; MG/1
20 CAPSULE, EXTENDED RELEASE ORAL EVERY MORNING
Qty: 30 CAPSULE | Refills: 0 | Status: SHIPPED | OUTPATIENT
Start: 2022-09-14 | End: 2022-10-14

## 2022-09-14 SDOH — ECONOMIC STABILITY: FOOD INSECURITY: WITHIN THE PAST 12 MONTHS, YOU WORRIED THAT YOUR FOOD WOULD RUN OUT BEFORE YOU GOT MONEY TO BUY MORE.: NEVER TRUE

## 2022-09-14 SDOH — ECONOMIC STABILITY: FOOD INSECURITY: WITHIN THE PAST 12 MONTHS, THE FOOD YOU BOUGHT JUST DIDN'T LAST AND YOU DIDN'T HAVE MONEY TO GET MORE.: NEVER TRUE

## 2022-09-14 ASSESSMENT — SOCIAL DETERMINANTS OF HEALTH (SDOH): HOW HARD IS IT FOR YOU TO PAY FOR THE VERY BASICS LIKE FOOD, HOUSING, MEDICAL CARE, AND HEATING?: NOT HARD AT ALL

## 2022-09-14 NOTE — PROGRESS NOTES
[address]  [address] [place] [address] [place] [address]  Dept: [phone]  Dept Fax: [phone]  Loc: [phone]      Subjective:     Chief Complaint   Patient presents with    ADHD       HPI:  Charles Groves is a 40 y.o. male presenting for    ADHD  -Pt here for follow up. He has been doing well on Vyvanse 50 mg however this medication has started become too expensive currently $400 per month. No adverse side effects with current medication. He is requesting alternative that is more affordable. Works at [employer], has had issues chronically with small details, numbers and focusing which has impacted his work performance. ROS:   Reviewed with patient and noted to be negative except that listed in HPI    PMHx:  Past Medical History:   Diagnosis Date    Asthma     as a child    Enlarged prostate     Hemorrhoid     Kidney stones     Rectal bleed      Patient Active Problem List   Diagnosis    Internal and external bleeding hemorrhoids    Family history of colonic polyps    Attention deficit disorder    Major depressive disorder with single episode, in partial remission ([facility] [address]    Cigarette smoker       PSHx:  Past Surgical History:   Procedure Laterality Date    COLONOSCOPY N/A 5/25/2016    Dr Meme Razo internal hemorrhoids, HP x 3    MOUTH SURGERY      Tooth extraction    WISDOM TOOTH EXTRACTION         PFHx:  Family History   Problem Relation Age of Onset    Breast Cancer Mother     Colon Polyps Father     Colon Cancer Paternal Grandmother     Colon Polyps Paternal Grandmother     Esophageal Cancer Neg Hx     Liver Cancer Neg Hx     Liver Disease Neg Hx     Stomach Cancer Neg Hx     Rectal Cancer Neg Hx        SocialHx:  Social History     Tobacco Use    Smoking status: Every Day     Packs/day: 0.50     Types: Cigarettes    Smokeless tobacco: Never   Substance Use Topics    Alcohol use:  Yes     Alcohol/week: 3.0 - 4.0 standard drinks Types: 3 - 4 Cans of beer per week     Comment: occassionally       Allergies:  No Known Allergies    Medications:  Current Outpatient Medications   Medication Sig Dispense Refill    amphetamine-dextroamphetamine (ADDERALL XR) 20 MG extended release capsule Take 1 capsule by mouth every morning for 30 days. 30 capsule 0    lisdexamfetamine (VYVANSE) 50 MG capsule Take 1 capsule by mouth every morning for 30 days. 30 capsule 0    PARoxetine (PAXIL) 20 MG tablet TAKE 1 TABLET BY MOUTH EVERY MORNING 90 tablet 0    azelastine (ASTELIN) 0.1 % nasal spray 2 sprays by Nasal route 2 times daily Use in each nostril as directed 60 mL 5    lisdexamfetamine (VYVANSE) 50 MG capsule Take 1 capsule by mouth every morning for 30 days. 30 capsule 0    ALLERGY RELIEF 180 MG tablet TAKE ONE TABLET BY MOUTH ONCE DAILY 90 tablet 3    fluticasone (FLONASE) 50 MCG/ACT nasal spray PUT ONE SPRAY IN EACH NOSTIL ONCE DAILY 16 g 3    buPROPion (WELLBUTRIN XL) 150 MG extended release tablet Take 1 tablet by mouth every morning 90 tablet 3    naproxen (NAPROSYN) 500 MG tablet Take 1 tablet by mouth 2 times daily (with meals) 60 tablet 11    Compression Sleeves R elbow compression sleeve  Dx: Tendonitis, right upper extremity 1 each 0    nicotine (NICODERM CQ) 21 MG/24HR Place 1 patch onto the skin every 24 hours 30 patch 1    Multiple Vitamin (MULTI VITAMIN DAILY) TABS Take 1 tablet by mouth daily 30 tablet 5     No current facility-administered medications for this visit. Objective:   PE:  /80   Pulse 90   Temp 98.2 °F (36.8 °C) (Temporal)   Resp 20   Ht 5' 8\" (1.727 m)   Wt 160 lb (72.6 kg)   SpO2 98%   BMI 24.33 kg/m²   Physical Exam  Constitutional:       General: He is not in acute distress. Appearance: Normal appearance. HENT:      Head: Normocephalic. Nose: Nose normal.      Mouth/Throat:      Mouth: Mucous membranes are moist.      Pharynx: Oropharynx is clear.  No oropharyngeal exudate or posterior oropharyngeal erythema. Eyes:      General: No scleral icterus. Extraocular Movements: Extraocular movements intact. Conjunctiva/sclera: Conjunctivae normal.   Cardiovascular:      Rate and Rhythm: Normal rate and regular rhythm. Pulses: Normal pulses. Heart sounds: No murmur heard. Pulmonary:      Effort: Pulmonary effort is normal.      Breath sounds: Normal breath sounds. Musculoskeletal:         General: Normal range of motion. Cervical back: Normal range of motion. Skin:     General: Skin is warm and dry. Capillary Refill: Capillary refill takes less than 2 seconds. Neurological:      General: No focal deficit present. Mental Status: He is alert and oriented to person, place, and time. Psychiatric:         Mood and Affect: Mood normal.         Behavior: Behavior normal.         Thought Content: Thought content normal.          Assessment & Plan   Castillo Swanson was seen today for adhd. Diagnoses and all orders for this visit:    Attention deficit disorder (ADD) without hyperactivity  -We will try equivalent dosing of alternative extended release, follow-up 4 weeks after starting. [employer] reviewed  -     amphetamine-dextroamphetamine (ADDERALL XR) 20 MG extended release capsule; Take 1 capsule by mouth every morning for 30 days.  -     POCT Rapid Drug Screen-> appropriate      Return in about 6 weeks (around 10/26/2022). All questions were answered. Medications, including possible adverse effects, and instructions were reviewed and  understanding was confirmed. Follow-up recommendations, including when to contact or return to office (ie; if symptoms worsen or fail to improve), were discussed and acknowledged.     Electronically signed by Linnette Youngblood MD on 9/14/22 at 5:05 PM CDT

## 2022-11-03 RX ORDER — PAROXETINE HYDROCHLORIDE 20 MG/1
20 TABLET, FILM COATED ORAL EVERY MORNING
Qty: 90 TABLET | Refills: 0 | Status: SHIPPED | OUTPATIENT
Start: 2022-11-03 | End: 2023-02-01

## 2022-11-10 DIAGNOSIS — F98.8 ATTENTION DEFICIT DISORDER (ADD) WITHOUT HYPERACTIVITY: ICD-10-CM

## 2022-11-10 RX ORDER — DEXTROAMPHETAMINE SACCHARATE, AMPHETAMINE ASPARTATE MONOHYDRATE, DEXTROAMPHETAMINE SULFATE AND AMPHETAMINE SULFATE 5; 5; 5; 5 MG/1; MG/1; MG/1; MG/1
20 CAPSULE, EXTENDED RELEASE ORAL EVERY MORNING
Qty: 30 CAPSULE | Refills: 0 | Status: SHIPPED | OUTPATIENT
Start: 2022-11-10 | End: 2022-12-10

## 2022-11-10 NOTE — TELEPHONE ENCOUNTER
Ron Anuel called to request a refill on his medication. Last office visit : 9/14/2022   Next office visit : 11/28/2022     Last UDS:   Amphetamine Screen, Urine   Date Value Ref Range Status   09/14/2022 positive  Final     Barbiturate Screen, Urine   Date Value Ref Range Status   09/14/2022 neg  Final     Benzodiazepine Screen, Urine   Date Value Ref Range Status   09/14/2022 neg  Final     Buprenorphine Urine   Date Value Ref Range Status   09/14/2022 neg  Final     Cocaine Metabolite Screen, Urine   Date Value Ref Range Status   09/14/2022 neg  Final     Gabapentin Screen, Urine   Date Value Ref Range Status   09/14/2022 neg  Final     MDMA, Urine   Date Value Ref Range Status   09/14/2022 neg  Final     Methamphetamine, Urine   Date Value Ref Range Status   09/14/2022 neg  Final     Opiate Scrn, Ur   Date Value Ref Range Status   09/14/2022 neg  Final     Oxycodone Screen, Ur   Date Value Ref Range Status   09/14/2022 neg  Final     PCP Screen, Urine   Date Value Ref Range Status   09/14/2022 neg  Final     Propoxyphene Screen, Urine   Date Value Ref Range Status   09/14/2022 neg  Final     THC Screen, Urine   Date Value Ref Range Status   09/14/2022 neg  Final     Tricyclic Antidepressants, Urine   Date Value Ref Range Status   09/14/2022 neg  Final       Last Kate Ror: 6-10-22  Medication Contract: 3-2022   Last Fill: 9-14-22    Requested Prescriptions     Pending Prescriptions Disp Refills    amphetamine-dextroamphetamine (ADDERALL XR) 20 MG extended release capsule 30 capsule 0     Sig: Take 1 capsule by mouth every morning for 30 days. Please approve or refuse this medication.    Kashif Cisneros LPN

## 2023-01-03 DIAGNOSIS — F98.8 ATTENTION DEFICIT DISORDER (ADD) WITHOUT HYPERACTIVITY: ICD-10-CM

## 2023-01-03 RX ORDER — DEXTROAMPHETAMINE SACCHARATE, AMPHETAMINE ASPARTATE MONOHYDRATE, DEXTROAMPHETAMINE SULFATE AND AMPHETAMINE SULFATE 5; 5; 5; 5 MG/1; MG/1; MG/1; MG/1
20 CAPSULE, EXTENDED RELEASE ORAL EVERY MORNING
Qty: 30 CAPSULE | Refills: 0 | OUTPATIENT
Start: 2023-01-03 | End: 2023-02-02

## 2023-01-11 DIAGNOSIS — F98.8 ATTENTION DEFICIT DISORDER (ADD) WITHOUT HYPERACTIVITY: ICD-10-CM

## 2023-01-12 RX ORDER — DEXTROAMPHETAMINE SACCHARATE, AMPHETAMINE ASPARTATE MONOHYDRATE, DEXTROAMPHETAMINE SULFATE AND AMPHETAMINE SULFATE 5; 5; 5; 5 MG/1; MG/1; MG/1; MG/1
20 CAPSULE, EXTENDED RELEASE ORAL EVERY MORNING
Qty: 30 CAPSULE | Refills: 0 | Status: SHIPPED | OUTPATIENT
Start: 2023-01-12 | End: 2023-02-11

## 2023-01-12 NOTE — TELEPHONE ENCOUNTER
Problem: Potential for Falls  Goal: Patient will remain free of falls  Description  INTERVENTIONS:  - Assess patient frequently for physical needs  -  Identify cognitive and physical deficits and behaviors that affect risk of falls    -  [place] fall precautions as indicated by assessment   - Educate patient/family on patient safety including physical limitations  - Instruct patient to call for assistance with activity based on assessment  - Modify environment to reduce risk of injury  - Consider OT/PT consult to assist with strengthening/mobility  Outcome: Progressing     Problem: Prexisting or High Potential for Compromised Skin Integrity  Goal: Skin integrity is maintained or improved  Description  INTERVENTIONS:  - Identify patients at risk for skin breakdown  - Assess and monitor skin integrity  - Assess and monitor nutrition and hydration status  - Monitor labs (i e  albumin)  - Assess for incontinence   - Turn and reposition patient  - Assist with mobility/ambulation  - Relieve pressure over bony prominences  - Avoid friction and shearing  - Provide appropriate hygiene as needed including keeping skin clean and dry  - Evaluate need for skin moisturizer/barrier cream  - Collaborate with interdisciplinary team (i e  Nutrition, Rehabilitation, etc )   - Patient/family teaching  Outcome: Progressing     Problem: PAIN - ADULT  Goal: Verbalizes/displays adequate comfort level or baseline comfort level  Description  Interventions:  - Encourage patient to monitor pain and request assistance  - Assess pain using appropriate pain scale  - Administer analgesics based on type and severity of pain and evaluate response  - Implement non-pharmacological measures as appropriate and evaluate response  - Consider cultural and social influences on pain and pain management  - Notify physician/advanced practitioner if interventions unsuccessful or patient reports new pain  Outcome: Progressing     Problem: INFECTION -
Yolis Llanos called to request a refill on his medication. Last office visit : 11/11/2021   Next office visit : 2/9/2022     Requested Prescriptions     Signed Prescriptions Disp Refills    buPROPion (WELLBUTRIN SR) 100 MG extended release tablet 180 tablet 1     Sig: Take 1 tablet by mouth in the morning for 7 days then increase to twice daily.      Authorizing Provider: Sergei Guerra     Ordering User: Haylie Kaminski MA
ADULT  Goal: Absence or prevention of progression during hospitalization  Description  INTERVENTIONS:  - Assess and monitor for signs and symptoms of infection  - Monitor lab/diagnostic results  - Monitor all insertion sites, i e  indwelling lines, tubes, and drains  - Monitor endotracheal (as able) and nasal secretions for changes in amount and color  - [place] appropriate cooling/warming therapies per order  - Administer medications as ordered  - Instruct and encourage patient and family to use good hand hygiene technique  - Identify and instruct in appropriate isolation precautions for identified infection/condition  Outcome: Progressing     Problem: SAFETY ADULT  Goal: Maintain or return to baseline ADL function  Description  INTERVENTIONS:  -  Assess patient's ability to carry out ADLs; assess patient's baseline for ADL function and identify physical deficits which impact ability to perform ADLs (bathing, care of mouth/teeth, toileting, grooming, dressing, etc )  - Assess/evaluate cause of self-care deficits   - Assess range of motion  - Assess patient's mobility; develop plan if impaired  - Assess patient's need for assistive devices and provide as appropriate  - Encourage maximum independence but intervene and supervise when necessary  ¯ Involve family in performance of ADLs  ¯ Assess for home care needs following discharge   ¯ Request OT consult to assist with ADL evaluation and planning for discharge  ¯ Provide patient education as appropriate  Outcome: Progressing  Goal: Maintain or return mobility status to optimal level  Description  INTERVENTIONS:  - Assess patient's baseline mobility status (ambulation, transfers, stairs, etc )    - Identify cognitive and physical deficits and behaviors that affect mobility  - Identify mobility aids required to assist with transfers and/or ambulation (gait belt, sit-to-stand, lift, walker, cane, etc )  - [place] fall precautions as indicated by assessment  - Record
patient progress and toleration of activity level on Mobility SBAR; progress patient to next Phase/Stage  - Instruct patient to call for assistance with activity based on assessment  - Request Rehabilitation consult to assist with strengthening/weightbearing, etc   Outcome: Progressing     Problem: DISCHARGE PLANNING  Goal: Discharge to home or other facility with appropriate resources  Description  INTERVENTIONS:  - Identify barriers to discharge w/patient and caregiver  - Arrange for needed discharge resources and transportation as appropriate  - Identify discharge learning needs (meds, wound care, etc )  - Arrange for interpretive services to assist at discharge as needed  - Refer to Case Management Department for coordinating discharge planning if the patient needs post-hospital services based on physician/advanced practitioner order or complex needs related to functional status, cognitive ability, or social support system  Outcome: Progressing     Problem: Knowledge Deficit  Goal: Patient/family/caregiver demonstrates understanding of disease process, treatment plan, medications, and discharge instructions  Description  Complete learning assessment and assess knowledge base  Interventions:  - Provide teaching at level of understanding  - Provide teaching via preferred learning methods  Outcome: Progressing     Problem: CARDIOVASCULAR - ADULT  Goal: Maintains optimal cardiac output and hemodynamic stability  Description  INTERVENTIONS:  - Monitor I/O, vital signs and rhythm  - Monitor for S/S and trends of decreased cardiac output i e  bleeding, hypotension  - Administer and titrate ordered vasoactive medications to optimize hemodynamic stability  - Assess quality of pulses, skin color and temperature  - Assess for signs of decreased coronary artery perfusion - ex   Angina  - Instruct patient to report change in severity of symptoms  Outcome: Progressing  Goal: Absence of cardiac dysrhythmias or at baseline
rhythm  Description  INTERVENTIONS:  - Continuous cardiac monitoring, monitor vital signs, obtain 12 lead EKG if indicated  - Administer antiarrhythmic and heart rate control medications as ordered  - Monitor electrolytes and administer replacement therapy as ordered  Outcome: Progressing     Problem: GASTROINTESTINAL - ADULT  Goal: Minimal or absence of nausea and/or vomiting  Description  INTERVENTIONS:  - Maintain NPO status until nausea and vomiting are resolved  - Administer ordered antiemetic medications as needed  - Provide nonpharmacologic comfort measures as appropriate  - Advance diet as tolerated, if ordered  - Nutrition services referral to assist patient with adequate nutrition and appropriate food choices   Outcome: Progressing  Goal: Maintains or returns to baseline bowel function  Description  INTERVENTIONS:  - Encourage oral fluids to ensure adequate hydration  - Administer ordered medications as needed  - Encourage mobilization and activity     Outcome: Progressing  Goal: Maintains adequate nutritional intake  Description  INTERVENTIONS:  - Identify factors contributing to decreased intake, treat as appropriate  - Monitor I&O, WT and lab values  - Obtain nutrition services referral as needed   Outcome: Progressing     Problem: HEMATOLOGIC - ADULT  Goal: Maintains hematologic stability  Description  INTERVENTIONS  - Monitor labs  Monitor hemodynamics for signs of sepsis, administer bolus and reorder labs per protocol       Outcome: Progressing     Problem: MUSCULOSKELETAL - ADULT  Goal: Maintain or return mobility to safest level of function  Description  INTERVENTIONS:  - Assess patient's ability to carry out ADLs; assess patient's baseline for ADL function and identify physical deficits which impact ability to perform ADLs (bathing, care of mouth/teeth, toileting, grooming, dressing, etc )  - Assess/evaluate cause of self-care deficits   - Assess range of motion  - Assess patient's mobility;
develop plan if impaired  - Assess patient's need for assistive devices and provide as appropriate  - Encourage maximum independence but intervene and supervise when necessary  - Involve family in performance of ADLs  - Assess for home care needs following discharge   - Request OT consult to assist with ADL evaluation and planning for discharge  - Provide patient education as appropriate  Outcome: Progressing  Goal: Maintain proper alignment of affected body part  Description  INTERVENTIONS:  - Support, maintain and protect limb and body alignment  - Provide pt/fam with appropriate education  Outcome: Progressing     Problem: METABOLIC, FLUID AND ELECTROLYTES - ADULT  Goal: Glucose maintained within target range  Description  INTERVENTIONS:  - Monitor Blood Glucose as ordered  - Assess for signs and symptoms of hyperglycemia and hypoglycemia  - Administer ordered medications to maintain glucose within target range  - Assess nutritional intake and initiate nutrition service referral as needed  Outcome: Progressing     Problem: GENITOURINARY - ADULT  Goal: Maintains or returns to baseline urinary function  Description  INTERVENTIONS:  - Assess urinary function  - Encourage oral fluids to ensure adequate hydration  - Administer IV fluids as ordered to ensure adequate hydration  - Administer ordered medications as needed  - Offer frequent toileting  - Follow urinary retention protocol if ordered  Outcome: Progressing  Goal: Absence of urinary retention  Description  INTERVENTIONS:  - Assess patient's ability to void and empty bladder  - Monitor I/O  - Bladder scan as needed  - Discuss with physician/AP medications to alleviate retention as needed  - Discuss catheterization for long term situations as appropriate   Outcome: Progressing  Goal: Urinary catheter remains patent  Description  INTERVENTIONS:  - Assess patency of urinary catheter  - If patient has a chronic mejia, consider changing catheter if
non-functioning  - Follow guidelines for intermittent irrigation of non-functioning urinary catheter  Outcome: Progressing
no

## 2023-01-12 NOTE — TELEPHONE ENCOUNTER
Ron Agee called to request a refill on his medication. Last office visit : 9/14/2022   Next office visit : 1/23/2023     Last UDS:   Amphetamine Screen, Urine   Date Value Ref Range Status   09/14/2022 positive  Final     Barbiturate Screen, Urine   Date Value Ref Range Status   09/14/2022 neg  Final     Benzodiazepine Screen, Urine   Date Value Ref Range Status   09/14/2022 neg  Final     Buprenorphine Urine   Date Value Ref Range Status   09/14/2022 neg  Final     Cocaine Metabolite Screen, Urine   Date Value Ref Range Status   09/14/2022 neg  Final     Gabapentin Screen, Urine   Date Value Ref Range Status   09/14/2022 neg  Final     MDMA, Urine   Date Value Ref Range Status   09/14/2022 neg  Final     Methamphetamine, Urine   Date Value Ref Range Status   09/14/2022 neg  Final     Opiate Scrn, Ur   Date Value Ref Range Status   09/14/2022 neg  Final     Oxycodone Screen, Ur   Date Value Ref Range Status   09/14/2022 neg  Final     PCP Screen, Urine   Date Value Ref Range Status   09/14/2022 neg  Final     Propoxyphene Screen, Urine   Date Value Ref Range Status   09/14/2022 neg  Final     THC Screen, Urine   Date Value Ref Range Status   09/14/2022 neg  Final     Tricyclic Antidepressants, Urine   Date Value Ref Range Status   09/14/2022 neg  Final       Last Kate Ror: 1/12/23  Medication Contract: 3/3/22   Last Fill: 11/10/22    Requested Prescriptions     Pending Prescriptions Disp Refills    amphetamine-dextroamphetamine (ADDERALL XR) 20 MG extended release capsule 30 capsule 0     Sig: Take 1 capsule by mouth every morning for 30 days. Please approve or refuse this medication.    Gela Pinedo MA

## 2023-02-07 ENCOUNTER — OFFICE VISIT (OUTPATIENT)
Dept: PRIMARY CARE CLINIC | Age: 38
End: 2023-02-07
Payer: COMMERCIAL

## 2023-02-07 VITALS
HEIGHT: 68 IN | HEART RATE: 96 BPM | SYSTOLIC BLOOD PRESSURE: 120 MMHG | WEIGHT: 170.4 LBS | BODY MASS INDEX: 25.82 KG/M2 | TEMPERATURE: 97.6 F | OXYGEN SATURATION: 96 % | DIASTOLIC BLOOD PRESSURE: 72 MMHG

## 2023-02-07 DIAGNOSIS — R19.7 DIARRHEA, UNSPECIFIED TYPE: ICD-10-CM

## 2023-02-07 DIAGNOSIS — F98.8 ATTENTION DEFICIT DISORDER (ADD) WITHOUT HYPERACTIVITY: ICD-10-CM

## 2023-02-07 DIAGNOSIS — Z13.29 SCREENING FOR HYPOTHYROIDISM: ICD-10-CM

## 2023-02-07 DIAGNOSIS — Z76.89 ENCOUNTER TO ESTABLISH CARE: Primary | ICD-10-CM

## 2023-02-07 DIAGNOSIS — F32.4 MAJOR DEPRESSIVE DISORDER WITH SINGLE EPISODE, IN PARTIAL REMISSION (HCC): ICD-10-CM

## 2023-02-07 DIAGNOSIS — Z12.11 SCREENING FOR COLON CANCER: ICD-10-CM

## 2023-02-07 DIAGNOSIS — Z13.1 SCREENING FOR DIABETES MELLITUS (DM): ICD-10-CM

## 2023-02-07 DIAGNOSIS — J30.1 ALLERGIC RHINITIS DUE TO POLLEN, UNSPECIFIED SEASONALITY: ICD-10-CM

## 2023-02-07 DIAGNOSIS — E78.5 HYPERLIPIDEMIA, UNSPECIFIED HYPERLIPIDEMIA TYPE: ICD-10-CM

## 2023-02-07 DIAGNOSIS — Z51.81 THERAPEUTIC DRUG MONITORING: ICD-10-CM

## 2023-02-07 DIAGNOSIS — R12 HEARTBURN: ICD-10-CM

## 2023-02-07 DIAGNOSIS — Z13.220 SCREENING FOR HYPERLIPIDEMIA: ICD-10-CM

## 2023-02-07 DIAGNOSIS — R11.2 NAUSEA AND VOMITING, UNSPECIFIED VOMITING TYPE: ICD-10-CM

## 2023-02-07 DIAGNOSIS — F98.8 ATTENTION DEFICIT DISORDER (ADD) WITHOUT HYPERACTIVITY: Primary | ICD-10-CM

## 2023-02-07 DIAGNOSIS — Z20.2 EXPOSURE TO STD: ICD-10-CM

## 2023-02-07 LAB
ALBUMIN SERPL-MCNC: 4.5 G/DL (ref 3.5–5.2)
ALCOHOL URINE: NORMAL
ALP BLD-CCNC: 96 U/L (ref 40–130)
ALT SERPL-CCNC: 22 U/L (ref 5–41)
AMPHETAMINE SCREEN, URINE: NORMAL
ANION GAP SERPL CALCULATED.3IONS-SCNC: 13 MMOL/L (ref 7–19)
AST SERPL-CCNC: 15 U/L (ref 5–40)
BARBITURATE SCREEN, URINE: NORMAL
BASOPHILS ABSOLUTE: 0.1 K/UL (ref 0–0.2)
BASOPHILS RELATIVE PERCENT: 1 % (ref 0–1)
BENZODIAZEPINE SCREEN, URINE: NORMAL
BILIRUB SERPL-MCNC: 0.3 MG/DL (ref 0.2–1.2)
BUN BLDV-MCNC: 18 MG/DL (ref 6–20)
BUPRENORPHINE URINE: NORMAL
CALCIUM SERPL-MCNC: 9.3 MG/DL (ref 8.6–10)
CHLORIDE BLD-SCNC: 103 MMOL/L (ref 98–111)
CHOLESTEROL, TOTAL: 202 MG/DL (ref 160–199)
CO2: 26 MMOL/L (ref 22–29)
COCAINE METABOLITE SCREEN URINE: NORMAL
CREAT SERPL-MCNC: 0.9 MG/DL (ref 0.5–1.2)
EOSINOPHILS ABSOLUTE: 0.2 K/UL (ref 0–0.6)
EOSINOPHILS RELATIVE PERCENT: 3.2 % (ref 0–5)
FENTANYL SCREEN, URINE: NORMAL
GABAPENTIN SCREEN, URINE: NORMAL
GFR SERPL CREATININE-BSD FRML MDRD: >60 ML/MIN/{1.73_M2}
GLUCOSE BLD-MCNC: 106 MG/DL (ref 74–109)
HBA1C MFR BLD: 5.4 % (ref 4–6)
HCT VFR BLD CALC: 44.9 % (ref 42–52)
HDLC SERPL-MCNC: 42 MG/DL (ref 55–121)
HEMOGLOBIN: 14.7 G/DL (ref 14–18)
IMMATURE GRANULOCYTES #: 0 K/UL
LDL CHOLESTEROL CALCULATED: 141 MG/DL
LYMPHOCYTES ABSOLUTE: 1.5 K/UL (ref 1.1–4.5)
LYMPHOCYTES RELATIVE PERCENT: 23.2 % (ref 20–40)
MCH RBC QN AUTO: 29.3 PG (ref 27–31)
MCHC RBC AUTO-ENTMCNC: 32.7 G/DL (ref 33–37)
MCV RBC AUTO: 89.6 FL (ref 80–94)
MDMA URINE: NORMAL
METHADONE SCREEN, URINE: NORMAL
METHAMPHETAMINE, URINE: NORMAL
MONOCYTES ABSOLUTE: 0.8 K/UL (ref 0–0.9)
MONOCYTES RELATIVE PERCENT: 11.9 % (ref 0–10)
NEUTROPHILS ABSOLUTE: 3.8 K/UL (ref 1.5–7.5)
NEUTROPHILS RELATIVE PERCENT: 60.5 % (ref 50–65)
OPIATE SCREEN URINE: NORMAL
OXYCODONE SCREEN URINE: NORMAL
PDW BLD-RTO: 13.2 % (ref 11.5–14.5)
PHENCYCLIDINE SCREEN URINE: NORMAL
PLATELET # BLD: 362 K/UL (ref 130–400)
PMV BLD AUTO: 9.5 FL (ref 9.4–12.4)
POTASSIUM SERPL-SCNC: 4.7 MMOL/L (ref 3.5–5)
PROPOXYPHENE SCREEN, URINE: NORMAL
RBC # BLD: 5.01 M/UL (ref 4.7–6.1)
SODIUM BLD-SCNC: 142 MMOL/L (ref 136–145)
SYNTHETIC CANNABINOIDS(K2) SCREEN, URINE: NORMAL
T4 FREE: 0.88 NG/DL (ref 0.93–1.7)
THC SCREEN, URINE: NORMAL
TOTAL PROTEIN: 7.2 G/DL (ref 6.6–8.7)
TRAMADOL SCREEN URINE: NORMAL
TRICYCLIC ANTIDEPRESSANTS, UR: NORMAL
TRIGL SERPL-MCNC: 97 MG/DL (ref 0–149)
TSH SERPL DL<=0.05 MIU/L-ACNC: 2.37 UIU/ML (ref 0.27–4.2)
WBC # BLD: 6.3 K/UL (ref 4.8–10.8)

## 2023-02-07 PROCEDURE — 80305 DRUG TEST PRSMV DIR OPT OBS: CPT | Performed by: NURSE PRACTITIONER

## 2023-02-07 PROCEDURE — 99214 OFFICE O/P EST MOD 30 MIN: CPT | Performed by: NURSE PRACTITIONER

## 2023-02-07 RX ORDER — FLUTICASONE PROPIONATE 50 MCG
2 SPRAY, SUSPENSION (ML) NASAL DAILY
Qty: 16 G | Refills: 5 | Status: SHIPPED | OUTPATIENT
Start: 2023-02-07

## 2023-02-07 RX ORDER — PAROXETINE HYDROCHLORIDE 20 MG/1
20 TABLET, FILM COATED ORAL EVERY MORNING
Qty: 90 TABLET | Refills: 3 | Status: SHIPPED | OUTPATIENT
Start: 2023-02-07 | End: 2024-02-02

## 2023-02-07 RX ORDER — AZELASTINE 1 MG/ML
2 SPRAY, METERED NASAL 2 TIMES DAILY
Qty: 60 ML | Refills: 5 | Status: SHIPPED | OUTPATIENT
Start: 2023-02-07

## 2023-02-07 RX ORDER — DEXTROAMPHETAMINE SACCHARATE, AMPHETAMINE ASPARTATE MONOHYDRATE, DEXTROAMPHETAMINE SULFATE AND AMPHETAMINE SULFATE 6.25; 6.25; 6.25; 6.25 MG/1; MG/1; MG/1; MG/1
25 CAPSULE, EXTENDED RELEASE ORAL EVERY MORNING
Qty: 30 CAPSULE | Refills: 0 | Status: SHIPPED | OUTPATIENT
Start: 2023-02-07 | End: 2023-03-09

## 2023-02-07 RX ORDER — SIMVASTATIN 20 MG
20 TABLET ORAL NIGHTLY
Qty: 90 TABLET | Refills: 0 | Status: SHIPPED | OUTPATIENT
Start: 2023-02-07

## 2023-02-07 RX ORDER — FEXOFENADINE HCL 180 MG/1
180 TABLET ORAL DAILY
Qty: 90 TABLET | Refills: 3 | Status: SHIPPED | OUTPATIENT
Start: 2023-02-07

## 2023-02-07 RX ORDER — BUPROPION HYDROCHLORIDE 150 MG/1
150 TABLET ORAL EVERY MORNING
Qty: 90 TABLET | Refills: 3 | Status: SHIPPED | OUTPATIENT
Start: 2023-02-07

## 2023-02-07 SDOH — ECONOMIC STABILITY: HOUSING INSECURITY
IN THE LAST 12 MONTHS, WAS THERE A TIME WHEN YOU DID NOT HAVE A STEADY PLACE TO SLEEP OR SLEPT IN A SHELTER (INCLUDING NOW)?: NO

## 2023-02-07 SDOH — ECONOMIC STABILITY: FOOD INSECURITY: WITHIN THE PAST 12 MONTHS, THE FOOD YOU BOUGHT JUST DIDN'T LAST AND YOU DIDN'T HAVE MONEY TO GET MORE.: NEVER TRUE

## 2023-02-07 SDOH — ECONOMIC STABILITY: FOOD INSECURITY: WITHIN THE PAST 12 MONTHS, YOU WORRIED THAT YOUR FOOD WOULD RUN OUT BEFORE YOU GOT MONEY TO BUY MORE.: NEVER TRUE

## 2023-02-07 SDOH — ECONOMIC STABILITY: INCOME INSECURITY: HOW HARD IS IT FOR YOU TO PAY FOR THE VERY BASICS LIKE FOOD, HOUSING, MEDICAL CARE, AND HEATING?: NOT HARD AT ALL

## 2023-02-07 ASSESSMENT — PATIENT HEALTH QUESTIONNAIRE - PHQ9
SUM OF ALL RESPONSES TO PHQ QUESTIONS 1-9: 6
3. TROUBLE FALLING OR STAYING ASLEEP: 1
SUM OF ALL RESPONSES TO PHQ QUESTIONS 1-9: 6
2. FEELING DOWN, DEPRESSED OR HOPELESS: 0
SUM OF ALL RESPONSES TO PHQ9 QUESTIONS 1 & 2: 0
SUM OF ALL RESPONSES TO PHQ QUESTIONS 1-9: 6
5. POOR APPETITE OR OVEREATING: 0
1. LITTLE INTEREST OR PLEASURE IN DOING THINGS: 0
10. IF YOU CHECKED OFF ANY PROBLEMS, HOW DIFFICULT HAVE THESE PROBLEMS MADE IT FOR YOU TO DO YOUR WORK, TAKE CARE OF THINGS AT HOME, OR GET ALONG WITH OTHER PEOPLE: 1
4. FEELING TIRED OR HAVING LITTLE ENERGY: 2
8. MOVING OR SPEAKING SO SLOWLY THAT OTHER PEOPLE COULD HAVE NOTICED. OR THE OPPOSITE, BEING SO FIGETY OR RESTLESS THAT YOU HAVE BEEN MOVING AROUND A LOT MORE THAN USUAL: 2
9. THOUGHTS THAT YOU WOULD BE BETTER OFF DEAD, OR OF HURTING YOURSELF: 0
7. TROUBLE CONCENTRATING ON THINGS, SUCH AS READING THE NEWSPAPER OR WATCHING TELEVISION: 1
6. FEELING BAD ABOUT YOURSELF - OR THAT YOU ARE A FAILURE OR HAVE LET YOURSELF OR YOUR FAMILY DOWN: 0
SUM OF ALL RESPONSES TO PHQ QUESTIONS 1-9: 6

## 2023-02-07 ASSESSMENT — ENCOUNTER SYMPTOMS
ABDOMINAL PAIN: 0
DIARRHEA: 1
SORE THROAT: 0
NAUSEA: 1
COUGH: 0
CHEST TIGHTNESS: 0
BLOOD IN STOOL: 1
COLOR CHANGE: 0
VOMITING: 0
SHORTNESS OF BREATH: 0

## 2023-02-07 NOTE — PROGRESS NOTES
[address] [address]     Phone:  [phone]  Fax:  [phone]      Joe Best is a 40 y.o. male who presents today for his medical conditions/complaints as noted below. Joe Best is c/o of ADHD, Medication Check, and Establish Care      Chief Complaint   Patient presents with    ADHD    Medication Check    Establish Care       HPI:     HPI     Patient presents to [facility] Silva patient. Patient in for ADHD and medication check. Would like to discuss increasing dosing. Does not feel it is enough throughout the day and wears off early. Patient also reports has episodes of nausea and diarrhea that happen every 2-3 months. Reports he has vomiting with nausea and diarrhea that is hot and acidic about every 2-3 months. Reports he has a hemorrhoid and occasionally sees bright red blood, but is not \"alarming. \" Reports he has a familyhistory of colon cancer. Had a colonoscopy with  polyps in 2016. Also desires sti testing due to recent exposure. Denies any current symptoms. check [employer]    Past Medical History:   Diagnosis Date    Asthma     as a child    Enlarged prostate     Hemorrhoid     Kidney stones     Rectal bleed         Past Surgical History:   Procedure Laterality Date    COLONOSCOPY N/A 2016    Dr Stephanie Ordoñez internal hemorrhoids, [place] x 3    MOUTH SURGERY      Tooth extraction    WISDOM TOOTH EXTRACTION         Social History     Tobacco Use    Smoking status: Former     Packs/day: 0.50     Types: Cigarettes     Quit date: 3/2/2022     Years since quittin.9    Smokeless tobacco: Never   Substance Use Topics    Alcohol use:  Yes     Alcohol/week: 3.0 - 4.0 standard drinks     Types: 3 - 4 Cans of beer per week     Comment: occassionally        Current Outpatient Medications   Medication Sig Dispense Refill    fexofenadine (ALLERGY RELIEF) 180 MG tablet Take 1 tablet by mouth daily 90 tablet 3    azelastine (ASTELIN) 0.1 % nasal spray 2 sprays by Nasal route 2 times daily Use in each nostril as directed 60 mL 5    buPROPion (WELLBUTRIN XL) 150 MG extended release tablet Take 1 tablet by mouth every morning 90 tablet 3    fluticasone (FLONASE) 50 MCG/ACT nasal spray 2 sprays by Nasal route daily 16 g 5    PARoxetine (PAXIL) 20 MG tablet Take 1 tablet by mouth every morning 90 tablet 3    Multiple Vitamin (MULTI VITAMIN DAILY) TABS Take 1 tablet by mouth daily 30 tablet 5    amphetamine-dextroamphetamine (ADDERALL XR) 25 MG extended release capsule Take 1 capsule by mouth every morning for 30 days. Max Daily Amount: 25 mg 30 capsule 0     No current facility-administered medications for this visit. No Known Allergies    Family History   Problem Relation Age of Onset    Breast Cancer Mother     Colon Polyps Father     Colon Cancer Paternal Grandmother     Colon Polyps Paternal Grandmother     Esophageal Cancer Neg Hx     Liver Cancer Neg Hx     Liver Disease Neg Hx     Stomach Cancer Neg Hx     Rectal Cancer Neg Hx                Subjective:      Review of Systems   Constitutional:  Negative for activity change and fever. HENT:  Negative for congestion, ear pain and sore throat. Respiratory:  Negative for cough, chest tightness and shortness of breath. Cardiovascular:  Negative for chest pain. Gastrointestinal:  Positive for blood in stool, diarrhea and nausea. Negative for abdominal pain and vomiting. Genitourinary:  Negative for frequency and urgency. Musculoskeletal:  Negative for arthralgias and myalgias. Skin:  Negative for color change. Neurological:  Negative for dizziness, weakness and numbness. Psychiatric/Behavioral:  Positive for decreased concentration and dysphoric mood. Negative for agitation. The patient is not nervous/anxious. Objective:     Physical Exam  Vitals reviewed. Constitutional:       Appearance: Normal appearance. HENT:      Head: Normocephalic.       Right Ear: Tympanic membrane normal.      Left Ear: Tympanic membrane normal.      Nose: Nose normal.      Mouth/Throat:      Mouth: Mucous membranes are moist.      Pharynx: Oropharynx is clear. Eyes:      Extraocular Movements: Extraocular movements intact. Pupils: Pupils are equal, round, and reactive to light. Cardiovascular:      Rate and Rhythm: Normal rate and regular rhythm. Pulses: Normal pulses. Heart sounds: Normal heart sounds. Pulmonary:      Effort: Pulmonary effort is normal.      Breath sounds: Normal breath sounds. Abdominal:      General: Bowel sounds are normal.      Palpations: Abdomen is soft. Musculoskeletal:         General: Normal range of motion. Cervical back: Normal range of motion. Skin:     General: Skin is warm and dry. Neurological:      Mental Status: He is alert and oriented to person, place, and time. Psychiatric:         Mood and Affect: Mood normal.         Behavior: Behavior normal.         Thought Content: Thought content normal.         Judgment: Judgment normal.       /72   Pulse 96   Temp 97.6 °F (36.4 °C) (Temporal)   Ht 5' 8\" (1.727 m)   Wt 170 lb 6.4 oz (77.3 kg)   SpO2 96%   BMI 25.91 kg/m²     Assessment:      Diagnosis Orders   1. Encounter to establish care        2. Attention deficit disorder (ADD) without hyperactivity  CBC with Auto Differential    Comprehensive Metabolic Panel      3. Allergic rhinitis due to pollen, unspecified seasonality  fexofenadine (ALLERGY RELIEF) 180 MG tablet    azelastine (ASTELIN) 0.1 % nasal spray    fluticasone (FLONASE) 50 MCG/ACT nasal spray      4. Major depressive disorder with single episode, in partial remission (HCC)  buPROPion (WELLBUTRIN XL) 150 MG extended release tablet    PARoxetine (PAXIL) 20 MG tablet    CBC with Auto Differential    Comprehensive Metabolic Panel      5. Heartburn  Abigail Tabor, APRN, Gastroenterology, [place]      6.  Nausea and vomiting, unspecified vomiting type  Sandra Netters, APRN, Gastroenterology, [place]      7. Diarrhea, unspecified type  [address], [place] Kiss, APRN, Gastroenterology, [place]      8. Screening for hypothyroidism  TSH    T4, Free      9. Exposure to STD        10. Screening for diabetes mellitus (DM)  Hemoglobin A1C      11. Screening for hyperlipidemia  Lipid Panel      12. Screening for colon cancer  [address] [place] [address], [facility] Kiss[facility]      13. Therapeutic drug monitoring  POCT Rapid Drug Screen          Results for orders placed or performed in visit on 02/07/23   POCT Rapid Drug Screen   Result Value Ref Range    Alcohol, Urine neg     Amphetamine Screen, Urine pos     Barbiturate Screen, Urine neg     Benzodiazepine Screen, Urine neg     Buprenorphine Urine neg     Cocaine Metabolite Screen, Urine neg     FENTANYL SCREEN, URINE neg     Gabapentin Screen, Urine neg     MDMA, Urine neg     Methadone Screen, Urine neg     Methamphetamine, Urine neg     Opiate Scrn, Ur neg     Oxycodone Screen, Ur neg     PCP Screen, Urine neg     Propoxyphene Screen, Urine neg     Synthetic Cannabinoids (K2) Screen, Urine neg     THC Screen, Urine neg     Tramadol Scrn, Ur neg     Tricyclic Antidepressants, Urine neg        Plan:     1. Encounter to establish care      2. Attention deficit disorder (ADD) without hyperactivity    - CBC with Auto Differential; Future  - Comprehensive Metabolic Panel; Future    3. Allergic rhinitis due to pollen, unspecified seasonality    - fexofenadine (ALLERGY RELIEF) 180 MG tablet; Take 1 tablet by mouth daily  Dispense: 90 tablet; Refill: 3  - azelastine (ASTELIN) 0.1 % nasal spray; 2 sprays by Nasal route 2 times daily Use in each nostril as directed  Dispense: 60 mL; Refill: 5  - fluticasone (FLONASE) 50 MCG/ACT nasal spray; 2 sprays by Nasal route daily  Dispense: 16 g; Refill: 5    4.  Major depressive disorder with single episode, in partial remission (HCC)    - buPROPion (WELLBUTRIN XL) 150 MG extended release tablet; Take 1 tablet by mouth every morning  Dispense: 90 tablet; Refill: 3  - PARoxetine (PAXIL) 20 MG tablet; Take 1 tablet by mouth every morning  Dispense: 90 tablet; Refill: 3  - CBC with Auto Differential; Future  - Comprehensive Metabolic Panel; Future    5. Heartburn    - Jaja Fraction, APRN, Gastroenterology, [place]    6. Nausea and vomiting, unspecified vomiting type  Declines anything for acid reflux today. - Jaja Fraction, APRN, Gastroenterology, [place]    7. Diarrhea, unspecified type    - [address] [place] [address], [address] [place] [address], [facility], Gastroenterology, [place]    8. Screening for hypothyroidism    - TSH; Future  - T4, Free; Future    9. Exposure to STD  [employer] sent    10. Screening for diabetes mellitus (DM)    - Hemoglobin A1C; Future    11. Screening for hyperlipidemia    - Lipid Panel; Future    12. Screening for colon cancer    - [address] [place] [address], [address] [place] [address], [facility], Gastroenterology, [place]    13. Therapeutic drug monitoring    - POCT Rapid Drug Screen       Return in about 1 month (around 3/7/2023) for adhd.     Orders Placed This Encounter   Procedures    CBC with Auto Differential     Standing Status:   Future     Number of Occurrences:   1     Standing Expiration Date:   2/7/2024    Comprehensive Metabolic Panel     Standing Status:   Future     Number of Occurrences:   1     Standing Expiration Date:   2/7/2024    Lipid Panel     Standing Status:   Future     Number of Occurrences:   1     Standing Expiration Date:   2/7/2024     Order Specific Question:   Is Patient Fasting?/# of Hours     Answer:   yes 8    TSH     Standing Status:   Future     Number of Occurrences:   1     Standing Expiration Date:   2/7/2024    T4, Free     Standing Status:   Future     Number of Occurrences:   1     Standing Expiration Date:   2/7/2024    Hemoglobin A1C     Standing Status:   Future     Number of Occurrences:   1     Standing Expiration Date:   2/7/2024 JUAN CARLOS Schulz, Gastroenterology, [place]     Referral Priority:   Routine     Referral Type:   Eval and Treat     Referral Reason:   Specialty Services Required     Referred to Provider:   JUAN CARLOS Johnston NP     Requested Specialty:   [facility] Nurse Practitioner     Number of Visits Requested:   1    POCT Rapid Drug Screen       Orders Placed This Encounter   Medications    fexofenadine (ALLERGY RELIEF) 180 MG tablet     Sig: Take 1 tablet by mouth daily     Dispense:  90 tablet     Refill:  3    azelastine (ASTELIN) 0.1 % nasal spray     Si sprays by Nasal route 2 times daily Use in each nostril as directed     Dispense:  60 mL     Refill:  5    buPROPion (WELLBUTRIN XL) 150 MG extended release tablet     Sig: Take 1 tablet by mouth every morning     Dispense:  90 tablet     Refill:  3    fluticasone (FLONASE) 50 MCG/ACT nasal spray     Si sprays by Nasal route daily     Dispense:  16 g     Refill:  5    PARoxetine (PAXIL) 20 MG tablet     Sig: Take 1 tablet by mouth every morning     Dispense:  90 tablet     Refill:  3            Patient offered educational handouts and has had all questions answered. Patient voices understanding and agrees to plans along with risks and benefits of plan. Patient is instructed to continue prior meds, diet, and exercise plans as instructed. Patient agrees to follow up as instructed and sooner if needed. Patient agrees to go to ER if condition becomes emergent. EMR Dragon/transcription disclaimer: Some of this encounter note is an electronic transcription/translation of spoken language to printed text. The electronic translation of spoken language may permit erroneous, or at times, nonsensical words or phrases to be inadvertently transcribed.  Although I have reviewed the note for such errors, some may still exist.    Electronically signed by JUAN CARLOS Burleson CNP on 2023 at 9:48 PM

## 2023-03-06 ENCOUNTER — OFFICE VISIT (OUTPATIENT)
Dept: GASTROENTEROLOGY | Age: 38
End: 2023-03-06
Payer: COMMERCIAL

## 2023-03-06 VITALS
DIASTOLIC BLOOD PRESSURE: 80 MMHG | HEART RATE: 82 BPM | HEIGHT: 68 IN | SYSTOLIC BLOOD PRESSURE: 120 MMHG | WEIGHT: 167 LBS | BODY MASS INDEX: 25.31 KG/M2 | OXYGEN SATURATION: 98 %

## 2023-03-06 DIAGNOSIS — R19.7 INTERMITTENT DIARRHEA: Primary | ICD-10-CM

## 2023-03-06 DIAGNOSIS — R11.10 INTERMITTENT VOMITING: ICD-10-CM

## 2023-03-06 PROCEDURE — 99204 OFFICE O/P NEW MOD 45 MIN: CPT | Performed by: NURSE PRACTITIONER

## 2023-03-06 ASSESSMENT — ENCOUNTER SYMPTOMS
TROUBLE SWALLOWING: 0
ABDOMINAL DISTENTION: 0
CHOKING: 0
COUGH: 0
SHORTNESS OF BREATH: 0
VOMITING: 1
RECTAL PAIN: 0
ABDOMINAL PAIN: 0
NAUSEA: 0
BLOOD IN STOOL: 0
CONSTIPATION: 0
ANAL BLEEDING: 0
DIARRHEA: 1

## 2023-03-06 NOTE — PROGRESS NOTES
Subjective:     Patient ID: Conchis Hector is a 41 y.o. male  PCP: JUAN CARLOS Snyder CNP  Referring Provider: JUAN CARLOS Wesley CNP    HPI  Patient presents to the office today with the following complaints: Gastroesophageal Reflux and Nausea & Vomiting      Pt seen today for c/o reflux, nausea. \"I get sick from time to time. \"  He will have diarrhea, vomiting during these spells. These episodes will occur \"few weeks after I have a lot of sinus drainage. \"  He c/o reflux 3-5 times a week. He denies any blood in stool. Denies any issues swallowing. Denies any abdominal pain. Last Colonoscopy 2016 - small internal hemorrhoids, HP x 3  Family hx colon polyp - Father  Family hx colon cancer - Paternal Grandmother    Assessment:     1. Intermittent diarrhea    2. Intermittent vomiting            Plan:   - Schedule Colonoscopy and EGD  Instruct on bowel prep. Nothing to eat or drink after midnight the day of the exam.  Unable to drive for 24 hours after the procedure. No aspirin or nonsteroidal anti-inflammatories for 5 days before procedure. I have discussed the benefits, alternatives, and risks (including bleeding, perforation and death)  for pursuing Endoscopy (EGD/Colonscopy/EUS/ERCP) with the patient and they are willing to continue. We also discussed the need for anesthesia, IV access, proper dietary changes, medication changes if necessary, and need for bowel prep (if ordered) prior to their Endoscopic procedure. They are aware they must have someone accompany them to their scheduled procedure to drive them home - they agree to the above and are willing to continue. Orders  No orders of the defined types were placed in this encounter. Medications  No orders of the defined types were placed in this encounter.         Patient History:     Past Medical History:   Diagnosis Date    Asthma     as a child    Enlarged prostate     Hemorrhoid     Kidney stones     Rectal bleed        Past Surgical History:   Procedure Laterality Date    COLONOSCOPY N/A 05/25/2016    Dr Vincenzo Marroquin internal hemorrhoids, HP x 3    MOUTH SURGERY      Tooth extraction    WISDOM TOOTH EXTRACTION         Family History   Problem Relation Age of Onset    Breast Cancer Mother 43    Colon Polyps Father     Breast Cancer Maternal Grandmother         age unknown    Rectal Cancer Paternal Grandmother     Colon Cancer Paternal Grandmother 76    Colon Polyps Paternal Grandmother     Esophageal Cancer Neg Hx     Liver Cancer Neg Hx     Liver Disease Neg Hx     Stomach Cancer Neg Hx        Social History     Socioeconomic History    Marital status:    Tobacco Use    Smoking status: Some Days     Packs/day: 0.50     Types: Cigarettes    Smokeless tobacco: Never   Vaping Use    Vaping Use: Every day    Substances: Nicotine   Substance and Sexual Activity    Alcohol use: Yes     Alcohol/week: 3.0 - 4.0 standard drinks     Types: 3 - 4 Cans of beer per week     Comment: occassionally    Drug use: No     Social Determinants of Health     Financial Resource Strain: Low Risk     Difficulty of Paying Living Expenses: Not hard at all   Food Insecurity: No Food Insecurity    Worried About Running Out of Food in the Last Year: Never true    Ran Out of Food in the Last Year: Never true   Transportation Needs: Unknown    Lack of Transportation (Non-Medical): No   Housing Stability: Unknown    Unstable Housing in the Last Year: No       Current Outpatient Medications   Medication Sig Dispense Refill    amphetamine-dextroamphetamine (ADDERALL XR) 25 MG extended release capsule Take 1 capsule by mouth every morning for 30 days.  Max Daily Amount: 25 mg 30 capsule 0    fexofenadine (ALLERGY RELIEF) 180 MG tablet Take 1 tablet by mouth daily 90 tablet 3    azelastine (ASTELIN) 0.1 % nasal spray 2 sprays by Nasal route 2 times daily Use in each nostril as directed 60 mL 5    buPROPion (WELLBUTRIN XL) 150 MG extended release tablet Take 1 tablet by mouth every morning 90 tablet 3    fluticasone (FLONASE) 50 MCG/ACT nasal spray 2 sprays by Nasal route daily 16 g 5    PARoxetine (PAXIL) 20 MG tablet Take 1 tablet by mouth every morning 90 tablet 3    simvastatin (ZOCOR) 20 MG tablet Take 1 tablet by mouth nightly 90 tablet 0    Multiple Vitamin (MULTI VITAMIN DAILY) TABS Take 1 tablet by mouth daily 30 tablet 5     No current facility-administered medications for this visit. No Known Allergies    Review of Systems   Constitutional:  Negative for activity change, appetite change, fatigue, fever and unexpected weight change. HENT:  Negative for trouble swallowing. Respiratory:  Negative for cough, choking and shortness of breath. Cardiovascular:  Negative for chest pain. Gastrointestinal:  Positive for diarrhea and vomiting. Negative for abdominal distention, abdominal pain, anal bleeding, blood in stool, constipation, nausea and rectal pain. Allergic/Immunologic: Negative for food allergies. All other systems reviewed and are negative. Objective:     /80 (Site: Left Upper Arm)   Pulse 82   Ht 5' 8\" (1.727 m)   Wt 167 lb (75.8 kg)   SpO2 98%   BMI 25.39 kg/m²     Physical Exam  Vitals reviewed. Constitutional:       General: He is not in acute distress. Appearance: He is well-developed. HENT:      Head: Normocephalic and atraumatic. Right Ear: External ear normal.      Left Ear: External ear normal.      Nose: Nose normal.   Eyes:      Conjunctiva/sclera: Conjunctivae normal.      Pupils: Pupils are equal, round, and reactive to light. Cardiovascular:      Rate and Rhythm: Normal rate and regular rhythm. Heart sounds: Normal heart sounds. No murmur heard. No friction rub. No gallop. Pulmonary:      Effort: Pulmonary effort is normal. No respiratory distress. Breath sounds: Normal breath sounds. Abdominal:      General: Bowel sounds are normal. There is no distension.       Palpations: Abdomen is soft. There is no mass. Tenderness: There is no abdominal tenderness. There is no guarding or rebound. Musculoskeletal:         General: Normal range of motion. Cervical back: Normal range of motion and neck supple. Skin:     General: Skin is warm and dry. Findings: No rash. Nails: There is no clubbing. Neurological:      Mental Status: He is alert and oriented to person, place, and time. Gait: Gait normal.   Psychiatric:         Behavior: Behavior normal.         Thought Content:  Thought content normal.

## 2023-03-06 NOTE — PATIENT INSTRUCTIONS
Schedule colonoscopy and endoscopy. Do not eat or drink after midnight the day of the procedure. Allowed medications can be taken with a small sip of water. Please review your prep instructions for allowed medications. You will not be able to drive for 24 hours after the procedure due to sedation. Must have a responsible adult, 18 years or older, accompany you to drive you home the day of your procedure. If you are on blood thinners, clearance from the prescribing physician will be obtained before your procedure is scheduled. If it is determined it is not safe to hold these medications for a short time an alternative procedure for evaluation may be recommended. No aspirin, ibuprofen, naproxen, fish oil or vitamin E for 5 days before procedure. Risks of colonoscopy and endoscopy include, but are not limited to, perforation, bleeding, and infection, Risk of perforation and bleeding are increased if there is a polyp removed or dilation completed. Anesthesia risks will be reviewed with you before the procedure by a member of the anesthesia department. Your physician may also schedule a follow up appointment with the nurse practitioner to discuss pathology, symptoms or to check if you have had any problems related to your procedure. If you prefer not to return to the office after your procedure please discuss this with your physician on the day of your colonoscopy. The physician will talk with you and/or your family after the procedure is completed. Final recommendations are based on the pathologist report if biopsies or specimens are taken. If polyps are removed during the procedure they will be sent to a pathologist for analysis. Unless you have a follow up appointment scheduled, you will be notified by mail of the pathology results within 4 weeks. If you have not received results after 4 weeks you may call the office to obtain this information. For Colonoscopy:   You will be given specific directions regarding restrictions to diet and bowel prep instructions including laxatives. Please read these instructions one week prior to your scheduled procedure to ensure that you are prepared. If you have any questions regarding these instructions please call our office Mon through Fri from 8:00 am to 4:00 pm.     Follow prep instructions provided for bowel prep. Take all of the bowel prep as directed. If you are having problems with nausea, stop your prep for 30-45 min to allow the nausea to subside before resuming your prep. It is important to drink plenty of fluids throughout the day before taking your laxatives. This will help to protect your kidneys, prevent dehydration and maximize the effect of the bowel prep. Your diet before a colonoscopy bowel preparation is very important to ensure a successful colon exam. It is recommended to consider certain changes to your diet three to four days prior to the procedure. Remember that your bowels need to be completely empty for the exam.    What foods are good to eat? Cut down on heavy solid foods three to four days before the procedure and start introducing lighter meals to your diet. The following food suggestions are a good part of your diet before a colonoscopy bowel preparation. Light meat that is easily digestible such as chicken (without the skin)   Potatoes without skin   Cheese   Eggs   A light meal of steamed white fish   Light clear soups    Foods and drinks to avoid  Avoid foods that contain too much fiber. Stay clear of dark colored beverages. They can stick to the walls of the digestive tract and make it difficult to differentiate from blood.  Some of these foods are:  Red meat, rice, nuts and vegetables   Milk, other milk based fluids and cream   Most fruit and puddings   Whole grain pasta   Cereals, bran and seeds   Colored beverages, especially those that are red or purple in color   Red colored Jell-O   On the day before the colonoscopy, continue to drink plenty of clear fluids. It is important   to keep yourself hydrated before the exam.     Please follow all instructions as provided for cleansing the bowel. Failure to have an adequately prepped colon may cause you to have incomplete exam with further testing required.     [email or web address]

## 2023-03-16 DIAGNOSIS — F98.8 ATTENTION DEFICIT DISORDER (ADD) WITHOUT HYPERACTIVITY: ICD-10-CM

## 2023-03-17 RX ORDER — DEXTROAMPHETAMINE SACCHARATE, AMPHETAMINE ASPARTATE MONOHYDRATE, DEXTROAMPHETAMINE SULFATE AND AMPHETAMINE SULFATE 6.25; 6.25; 6.25; 6.25 MG/1; MG/1; MG/1; MG/1
25 CAPSULE, EXTENDED RELEASE ORAL EVERY MORNING
Qty: 30 CAPSULE | Refills: 0 | Status: SHIPPED | OUTPATIENT
Start: 2023-03-17 | End: 2023-04-16

## 2023-03-17 NOTE — TELEPHONE ENCOUNTER
Rina Pallas called to request a refill on his medication. Last office visit : 2/7/2023   Next office visit : Visit date not found     Last UDS:   Amphetamine Screen, Urine   Date Value Ref Range Status   02/07/2023 pos  Final     Barbiturate Screen, Urine   Date Value Ref Range Status   02/07/2023 neg  Final     Benzodiazepine Screen, Urine   Date Value Ref Range Status   02/07/2023 neg  Final     Buprenorphine Urine   Date Value Ref Range Status   02/07/2023 neg  Final     Cocaine Metabolite Screen, Urine   Date Value Ref Range Status   02/07/2023 neg  Final     Gabapentin Screen, Urine   Date Value Ref Range Status   02/07/2023 neg  Final     MDMA, Urine   Date Value Ref Range Status   02/07/2023 neg  Final     Methamphetamine, Urine   Date Value Ref Range Status   02/07/2023 neg  Final     Opiate Scrn, Ur   Date Value Ref Range Status   02/07/2023 neg  Final     Oxycodone Screen, Ur   Date Value Ref Range Status   02/07/2023 neg  Final     PCP Screen, Urine   Date Value Ref Range Status   02/07/2023 neg  Final     Propoxyphene Screen, Urine   Date Value Ref Range Status   02/07/2023 neg  Final     THC Screen, Urine   Date Value Ref Range Status   02/07/2023 neg  Final     Tricyclic Antidepressants, Urine   Date Value Ref Range Status   02/07/2023 neg  Final       Last Sveta Enrike: 1/11/2023  Medication Contract: 2/7/23   Last Fill: 2/7/23    Requested Prescriptions     Pending Prescriptions Disp Refills    amphetamine-dextroamphetamine (ADDERALL XR) 25 MG extended release capsule 30 capsule 0     Sig: Take 1 capsule by mouth every morning for 30 days. Max Daily Amount: 25 mg               Please approve or refuse this medication.    Luly Dejesus LPN

## 2023-04-13 ENCOUNTER — HOSPITAL ENCOUNTER (OUTPATIENT)
Age: 38
Setting detail: OUTPATIENT SURGERY
Discharge: HOME | End: 2023-04-13
Attending: INTERNAL MEDICINE | Admitting: INTERNAL MEDICINE

## 2023-04-13 ENCOUNTER — APPOINTMENT (OUTPATIENT)
Dept: OPERATING ROOM | Age: 38
End: 2023-04-13

## 2023-04-13 VITALS
TEMPERATURE: 98.1 F | SYSTOLIC BLOOD PRESSURE: 114 MMHG | RESPIRATION RATE: 16 BRPM | BODY MASS INDEX: 26.68 KG/M2 | HEART RATE: 76 BPM | HEIGHT: 67 IN | WEIGHT: 170 LBS | DIASTOLIC BLOOD PRESSURE: 73 MMHG | OXYGEN SATURATION: 98 %

## 2023-04-13 PROCEDURE — 45380 COLONOSCOPY AND BIOPSY: CPT

## 2023-04-13 PROCEDURE — 45385 COLONOSCOPY W/LESION REMOVAL: CPT

## 2023-04-13 PROCEDURE — G8918 PT W/O PREOP ORDER IV AB PRO: HCPCS

## 2023-04-13 PROCEDURE — G8907 PT DOC NO EVENTS ON DISCHARG: HCPCS

## 2023-04-13 PROCEDURE — 43239 EGD BIOPSY SINGLE/MULTIPLE: CPT

## 2023-04-13 RX ORDER — ONDANSETRON 2 MG/ML
4 INJECTION INTRAMUSCULAR; INTRAVENOUS
Status: CANCELLED | OUTPATIENT
Start: 2023-04-13 | End: 2023-04-14

## 2023-04-13 RX ORDER — SODIUM CHLORIDE, SODIUM LACTATE, POTASSIUM CHLORIDE, CALCIUM CHLORIDE 600; 310; 30; 20 MG/100ML; MG/100ML; MG/100ML; MG/100ML
INJECTION, SOLUTION INTRAVENOUS CONTINUOUS
Status: DISCONTINUED | OUTPATIENT
Start: 2023-04-13 | End: 2023-04-13 | Stop reason: HOSPADM

## 2023-04-13 RX ORDER — PANTOPRAZOLE SODIUM 40 MG/1
40 TABLET, DELAYED RELEASE ORAL
Qty: 60 TABLET | Refills: 3 | Status: SHIPPED | OUTPATIENT
Start: 2023-04-13

## 2023-04-13 RX ADMIN — SODIUM CHLORIDE, SODIUM LACTATE, POTASSIUM CHLORIDE, CALCIUM CHLORIDE: 600; 310; 30; 20 INJECTION, SOLUTION INTRAVENOUS at 11:17

## 2023-04-13 ASSESSMENT — PAIN - FUNCTIONAL ASSESSMENT: PAIN_FUNCTIONAL_ASSESSMENT: NONE - DENIES PAIN

## 2023-04-13 NOTE — OP NOTE
Patient: Mitra Valentin : 1985  Med Rec#: [id number] Acc#: [id number]   Primary Care Provider JUAN CARLOS Canada CNP    Date of Procedure:  2023    Endoscopist: Eris King MD    Referring Provider: JUAN CARLOS Canada CNP, JUAN CARLOS Harper    Operation Performed:     Colonoscopy with biopsy  Colonoscopy with snare polypectomy     Indications: Diarrhea, hx of colon polyps    Anesthesia:  Sedation was administered by anesthesia who monitored the patient during the procedure. I met with Mitra Valentin prior to procedure. We discussed the procedure itself, and I have discussed the risks of endoscopy (including-- but not limited to-- pain, discomfort, bleeding potentially requiring second endoscopic procedure and/or blood transfusion, organ perforation requiring operative repair, damage to organs near the colon, infection, aspiration, cardiopulmonary/allergic reaction), benefits, indications to endoscopy. Additionally, we discussed options other than colonoscopy. The patient expressed understanding. All questions answered. The patient decided to proceed with the procedure. Signed informed consent was placed on the chart. Blood Loss: minimal    Withdrawal time: n/a  Bowel Prep: adequate     Complications: no immediate complications    DESCRIPTION OF PROCEDURE:     A time out was performed. After written informed consent was obtained, the patient was placed in the left lateral position. The perianal area was inspected, and a digital rectal exam was performed. A rectal exam was performed: normal tone, no palpable lesions. At this point, a forward viewing Olympus colonoscope was inserted into the anus and carefully advanced to the terminal ileum. The cecum was identified by the ileocecal valve and the appendiceal orifice. The colonoscope was then slowly withdrawn with careful inspection of the mucosa in a linear and circumferential fashion. The scope was retroflexed in the rectum.  Suction

## 2023-04-13 NOTE — OP NOTE
Endoscopic Procedure Note    Patient: Irina Krueger : 1985  Med Rec#: [id number] Acc#: [id number]     Primary Care Provider JUAN CARLOS Crane - CNP  Referring Provider: JUAN CARLOS Kimbrough    Endoscopist: Teresa Hill MD    Date of Procedure:  2023    Procedure:   1. EGD with biopsy    Indications:   1. Nausea/vomiting  2. Diarrhea     Anesthesia:  Sedation was administered by anesthesia who monitored the patient during the procedure. Estimated Blood Loss: minimal    Procedure:   After reviewing the patient's chart and obtaining informed consent, the patient was placed in the left lateral decubitus position. A forward-viewing Olympus endoscope was lubricated and inserted through the mouth into the oropharynx. Under direct visualization, the upper esophagus was intubated. The scope was advanced to the level of the third portion of duodenum. Scope was slowly withdrawn with careful inspection of the mucosal surfaces. The scope was retroflexed for inspection of the gastric fundus and incisura. Findings and maneuvers are listed in impression below. The patient tolerated the procedure well. The scope was removed. There were no immediate complications. Findings:   Esophagus: abnormal: there are mucosal changes of esophagitis vs Hamilton's- biopsied. There is no hiatal hernia present. Stomach:  Abnormal: Mild mucosal changes suggestive of nodular gastritis noted -  Gastric biopsies were taken from the antrum and body to rule out Helicobacter pylori infection. Duodenum: normal: biopsies obtained to rule out celiac sprue      IMPRESSION:  1. S/p duodenal and gastric biopsies obtained as above  2. Esophagitis vs Hamilton's- biopsied     RECOMMENDATIONS:    1. Await path results  2. Start PPI (protonix) twice a day x 3 months, then decrease to daily  3. If histology negative, consider GB studies  4. Follow up OV with JUAN CARLOS Verdugo in 6-8 weeks.      The results were discussed with the

## 2023-04-13 NOTE — DISCHARGE INSTRUCTIONS
EGD RECOMMENDATIONS:    1. Await path results  2. Start PPI (protonix) twice a day x 3 months, then decrease to daily  3. If histology negative, consider GB studies  4. Follow up OV with JUAN CARLOS Cheung in 6-8 weeks. Recommendations:  1. Repeat colonoscopy: pending pathology - 5 years for CRC screening  2. Await biopsy results    POST-OP ORDERS: ENDOSCOPY & COLONOSCOPY:    1. Rest today. 2. DO NOT eat or drink until wide awake; eat your usual diet today in moderate amount only. 3. DO NOT drive today. 4. Call physician if you have severe pain, vomiting, fever, rectal bleeding or black bowel movements. 5.  If a biopsy was taken or a polyp removed, you should expect to hear results in about 7-10 days. If you have heard nothing from your physician by then, call the office for results. 6.  Discharge home when patient awake, vitals signs stable and tolerating liquids.

## 2023-04-13 NOTE — H&P
Patient Name: Kori Lester  : 1985  MRN: [id number]  DATE: 23    Allergies: No Known Allergies     ENDOSCOPY  History and Physical    Procedure:    [x] Diagnostic Colonoscopy       [] Screening Colonoscopy  [x] EGD      [] ERCP      [] EUS       [] Other    [x] Previous office notes/History and Physical reviewed from the patients chart. Please see EMR for further details of HPI. I have examined the patient's status immediately prior to the procedure and:      Indications/HPI:    []Abdominal Pain   []Barretts  []Screening/Surveillance   []History of Polyps  []Dysphagia            [] +Cologard/DNA testing  []Abnormal Imaging              []EOE Hx              [] Family Hx of CRC/Polyps  []Anemia                            []Food Impaction       []Recent Poor Prep  []GI Bleed             []Lymphadenopathy  [x]History of Polyps  []Change in bowel habits []Heartburn/Reflux  []Cancer- GI/Lung  []Chest Pain - Non Cardiac []Heme (+) Stool []Ulcers  []Constipation  []Hemoptysis  []Incontinence    [x]Diarrhea  []Hypoxemia  []Rectal Bleed (BRBPR)  [x]Nausea/Vomiting   [] Varices  []Crohns/Colitis  []Pancreatic Cyst   [] Cirrhosis   []Pancreatitis    []Abnormal MRCP  []Elevated LFT [] Stent Removal, Previous ERCP  []Other:     Anesthesia:   [x] MAC [] Moderate Sedation   [] General   [] None     ROS: 12 pt Review of Symptoms was negative unless mentioned above    Medications:   Prior to Admission medications    Medication Sig Start Date End Date Taking? Authorizing Provider   amphetamine-dextroamphetamine (ADDERALL XR) 25 MG extended release capsule Take 1 capsule by mouth every morning for 30 days.  Max Daily Amount: 25 mg 3/17/23 4/16/23  Fredo Stoner MD   fexofenadine (ALLERGY RELIEF) 180 MG tablet Take 1 tablet by mouth daily 23   JUAN CARLOS Thomas - CNP   azelastine (ASTELIN) 0.1 % nasal spray 2 sprays by Nasal route 2 times daily Use in each nostril as directed 23   JUAN CARLOS Thomas -

## 2023-04-16 DIAGNOSIS — F98.8 ATTENTION DEFICIT DISORDER (ADD) WITHOUT HYPERACTIVITY: ICD-10-CM

## 2023-04-17 RX ORDER — DEXTROAMPHETAMINE SACCHARATE, AMPHETAMINE ASPARTATE MONOHYDRATE, DEXTROAMPHETAMINE SULFATE AND AMPHETAMINE SULFATE 6.25; 6.25; 6.25; 6.25 MG/1; MG/1; MG/1; MG/1
25 CAPSULE, EXTENDED RELEASE ORAL EVERY MORNING
Qty: 30 CAPSULE | Refills: 0 | Status: SHIPPED | OUTPATIENT
Start: 2023-04-17 | End: 2023-05-17

## 2023-04-17 NOTE — TELEPHONE ENCOUNTER
Lala Galvez called to request a refill on his medication. Last office visit : 2/7/2023   Next office visit : Visit date not found     Last UDS:   Amphetamine Screen, Urine   Date Value Ref Range Status   02/07/2023 pos  Final     Barbiturate Screen, Urine   Date Value Ref Range Status   02/07/2023 neg  Final     Benzodiazepine Screen, Urine   Date Value Ref Range Status   02/07/2023 neg  Final     Buprenorphine Urine   Date Value Ref Range Status   02/07/2023 neg  Final     Cocaine Metabolite Screen, Urine   Date Value Ref Range Status   02/07/2023 neg  Final     Gabapentin Screen, Urine   Date Value Ref Range Status   02/07/2023 neg  Final     MDMA, Urine   Date Value Ref Range Status   02/07/2023 neg  Final     Methamphetamine, Urine   Date Value Ref Range Status   02/07/2023 neg  Final     Opiate Scrn, Ur   Date Value Ref Range Status   02/07/2023 neg  Final     Oxycodone Screen, Ur   Date Value Ref Range Status   02/07/2023 neg  Final     PCP Screen, Urine   Date Value Ref Range Status   02/07/2023 neg  Final     Propoxyphene Screen, Urine   Date Value Ref Range Status   02/07/2023 neg  Final     THC Screen, Urine   Date Value Ref Range Status   02/07/2023 neg  Final     Tricyclic Antidepressants, Urine   Date Value Ref Range Status   02/07/2023 neg  Final       Last Terrance Martinez: 1-11-23  Medication Contract: 02-7-23   Last Fill: 3-17-23    Requested Prescriptions     Pending Prescriptions Disp Refills    amphetamine-dextroamphetamine (ADDERALL XR) 25 MG extended release capsule 30 capsule 0     Sig: Take 1 capsule by mouth every morning for 30 days. Max Daily Amount: 25 mg         Please approve or refuse this medication.    Chris Gama MA

## 2023-04-19 ENCOUNTER — TELEPHONE (OUTPATIENT)
Dept: GASTROENTEROLOGY | Age: 38
End: 2023-04-19

## 2023-04-19 DIAGNOSIS — F98.8 ATTENTION DEFICIT DISORDER (ADD) WITHOUT HYPERACTIVITY: ICD-10-CM

## 2023-04-19 RX ORDER — BUDESONIDE 3 MG/1
9 CAPSULE, COATED PELLETS ORAL EVERY MORNING
Qty: 90 CAPSULE | Refills: 2 | Status: SHIPPED | OUTPATIENT
Start: 2023-04-19 | End: 2023-05-19

## 2023-04-19 RX ORDER — DEXTROAMPHETAMINE SACCHARATE, AMPHETAMINE ASPARTATE MONOHYDRATE, DEXTROAMPHETAMINE SULFATE AND AMPHETAMINE SULFATE 6.25; 6.25; 6.25; 6.25 MG/1; MG/1; MG/1; MG/1
25 CAPSULE, EXTENDED RELEASE ORAL EVERY MORNING
Qty: 30 CAPSULE | Refills: 0 | OUTPATIENT
Start: 2023-04-19 | End: 2023-05-19

## 2023-04-19 NOTE — TELEPHONE ENCOUNTER
Pt advised of results and recommendations. Apt has been moved up, recall added. Pt to let us know if his medication is expensive or if he needs anything else.    ----- Message from Aliyah Torres MD sent at 4/19/2023 12:15 AM CDT -----  CM -   Please contact patient and inform of results. Colon - microscopic colitis  EGD -Hamilton's esophagus with ? LGD    Plan  - I will send a Rx for Entocort 9mg daily  - He needs to be taking the PPI BID  - Repeat EGD in 6 month    I would recommend a f/u OV with JG or me in 4-6 weeks to discuss above and check symptom response.      Thanks

## 2023-05-16 DIAGNOSIS — F98.8 ATTENTION DEFICIT DISORDER (ADD) WITHOUT HYPERACTIVITY: ICD-10-CM

## 2023-05-16 RX ORDER — DEXTROAMPHETAMINE SACCHARATE, AMPHETAMINE ASPARTATE MONOHYDRATE, DEXTROAMPHETAMINE SULFATE AND AMPHETAMINE SULFATE 6.25; 6.25; 6.25; 6.25 MG/1; MG/1; MG/1; MG/1
25 CAPSULE, EXTENDED RELEASE ORAL EVERY MORNING
Qty: 30 CAPSULE | Refills: 0 | Status: SHIPPED | OUTPATIENT
Start: 2023-05-17 | End: 2023-06-16

## 2023-05-16 NOTE — TELEPHONE ENCOUNTER
Ferny Powers called to request a refill on his medication. Last office visit : 2/7/2023   Next office visit :6/7/23    Last UDS:   Amphetamine Screen, Urine   Date Value Ref Range Status   02/07/2023 pos  Final     Barbiturate Screen, Urine   Date Value Ref Range Status   02/07/2023 neg  Final     Benzodiazepine Screen, Urine   Date Value Ref Range Status   02/07/2023 neg  Final     Buprenorphine Urine   Date Value Ref Range Status   02/07/2023 neg  Final     Cocaine Metabolite Screen, Urine   Date Value Ref Range Status   02/07/2023 neg  Final     Gabapentin Screen, Urine   Date Value Ref Range Status   02/07/2023 neg  Final     MDMA, Urine   Date Value Ref Range Status   02/07/2023 neg  Final     Methamphetamine, Urine   Date Value Ref Range Status   02/07/2023 neg  Final     Opiate Scrn, Ur   Date Value Ref Range Status   02/07/2023 neg  Final     Oxycodone Screen, Ur   Date Value Ref Range Status   02/07/2023 neg  Final     PCP Screen, Urine   Date Value Ref Range Status   02/07/2023 neg  Final     Propoxyphene Screen, Urine   Date Value Ref Range Status   02/07/2023 neg  Final     THC Screen, Urine   Date Value Ref Range Status   02/07/2023 neg  Final     Tricyclic Antidepressants, Urine   Date Value Ref Range Status   02/07/2023 neg  Final       Last Ether Keas: 5/16/23  Medication Contract:  2/7/23  Last Fill: 4/17/23    Requested Prescriptions     Pending Prescriptions Disp Refills    amphetamine-dextroamphetamine (ADDERALL XR) 25 MG extended release capsule 30 capsule 0     Sig: Take 1 capsule by mouth every morning for 30 days. Max Daily Amount: 25 mg         Please approve or refuse this medication.    Jeremy Archer LPN

## 2023-06-08 DIAGNOSIS — F98.8 ATTENTION DEFICIT DISORDER (ADD) WITHOUT HYPERACTIVITY: ICD-10-CM

## 2023-06-09 RX ORDER — DEXTROAMPHETAMINE SACCHARATE, AMPHETAMINE ASPARTATE MONOHYDRATE, DEXTROAMPHETAMINE SULFATE AND AMPHETAMINE SULFATE 6.25; 6.25; 6.25; 6.25 MG/1; MG/1; MG/1; MG/1
25 CAPSULE, EXTENDED RELEASE ORAL EVERY MORNING
Qty: 30 CAPSULE | Refills: 0 | OUTPATIENT
Start: 2023-06-09 | End: 2023-07-09

## 2023-06-09 NOTE — TELEPHONE ENCOUNTER
Spoke with patient and he agreed to come in 6/12/23 @ 9am. He was offered other times as well but chose 9.

## 2023-06-09 NOTE — TELEPHONE ENCOUNTER
Kamila Uribe called to request a refill on his medication. Last office visit : 2/7/2023   Next office visit : Visit date not found     Last UDS:   Amphetamine Screen, Urine   Date Value Ref Range Status   02/07/2023 pos  Final     Barbiturate Screen, Urine   Date Value Ref Range Status   02/07/2023 neg  Final     Benzodiazepine Screen, Urine   Date Value Ref Range Status   02/07/2023 neg  Final     Buprenorphine Urine   Date Value Ref Range Status   02/07/2023 neg  Final     Cocaine Metabolite Screen, Urine   Date Value Ref Range Status   02/07/2023 neg  Final     Gabapentin Screen, Urine   Date Value Ref Range Status   02/07/2023 neg  Final     MDMA, Urine   Date Value Ref Range Status   02/07/2023 neg  Final     Methamphetamine, Urine   Date Value Ref Range Status   02/07/2023 neg  Final     Opiate Scrn, Ur   Date Value Ref Range Status   02/07/2023 neg  Final     Oxycodone Screen, Ur   Date Value Ref Range Status   02/07/2023 neg  Final     PCP Screen, Urine   Date Value Ref Range Status   02/07/2023 neg  Final     Propoxyphene Screen, Urine   Date Value Ref Range Status   02/07/2023 neg  Final     THC Screen, Urine   Date Value Ref Range Status   02/07/2023 neg  Final     Tricyclic Antidepressants, Urine   Date Value Ref Range Status   02/07/2023 neg  Final       Last Lonney Niece: 1/12/23  Medication Contract: 2/7/23   Last Fill: 5/17/23    Requested Prescriptions     Pending Prescriptions Disp Refills    amphetamine-dextroamphetamine (ADDERALL XR) 25 MG extended release capsule 30 capsule 0     Sig: Take 1 capsule by mouth every morning for 30 days. Max Daily Amount: 25 mg         Please approve or refuse this medication.    Anil Topete

## 2023-06-18 DIAGNOSIS — F98.8 ATTENTION DEFICIT DISORDER (ADD) WITHOUT HYPERACTIVITY: ICD-10-CM

## 2023-06-19 RX ORDER — DEXTROAMPHETAMINE SACCHARATE, AMPHETAMINE ASPARTATE MONOHYDRATE, DEXTROAMPHETAMINE SULFATE AND AMPHETAMINE SULFATE 6.25; 6.25; 6.25; 6.25 MG/1; MG/1; MG/1; MG/1
25 CAPSULE, EXTENDED RELEASE ORAL EVERY MORNING
Qty: 30 CAPSULE | Refills: 0 | OUTPATIENT
Start: 2023-06-19 | End: 2023-07-19

## 2023-07-19 DIAGNOSIS — F98.8 ATTENTION DEFICIT DISORDER (ADD) WITHOUT HYPERACTIVITY: ICD-10-CM

## 2023-07-20 RX ORDER — DEXTROAMPHETAMINE SACCHARATE, AMPHETAMINE ASPARTATE MONOHYDRATE, DEXTROAMPHETAMINE SULFATE AND AMPHETAMINE SULFATE 6.25; 6.25; 6.25; 6.25 MG/1; MG/1; MG/1; MG/1
25 CAPSULE, EXTENDED RELEASE ORAL EVERY MORNING
Qty: 30 CAPSULE | Refills: 0 | Status: SHIPPED | OUTPATIENT
Start: 2023-07-20 | End: 2023-08-19

## 2023-07-20 NOTE — TELEPHONE ENCOUNTER
Criselda Aguayo called to request a refill on his medication. Last office visit : 6/12/2023   Next office visit : 9/19/2023     Last UDS:   Amphetamine Screen, Urine   Date Value Ref Range Status   06/12/2023 neg  Final     Barbiturate Screen, Urine   Date Value Ref Range Status   06/12/2023 neg  Final     Benzodiazepine Screen, Urine   Date Value Ref Range Status   06/12/2023 neg  Final     Buprenorphine Urine   Date Value Ref Range Status   06/12/2023 neg  Final     Cocaine Metabolite Screen, Urine   Date Value Ref Range Status   06/12/2023 neg  Final     Gabapentin Screen, Urine   Date Value Ref Range Status   06/12/2023 neg  Final     MDMA, Urine   Date Value Ref Range Status   06/12/2023 neg  Final     Methamphetamine, Urine   Date Value Ref Range Status   06/12/2023 neg  Final     Opiate Scrn, Ur   Date Value Ref Range Status   06/12/2023 neg  Final     Oxycodone Screen, Ur   Date Value Ref Range Status   06/12/2023 neg  Final     PCP Screen, Urine   Date Value Ref Range Status   06/12/2023 neg  Final     Propoxyphene Screen, Urine   Date Value Ref Range Status   06/12/2023 neg  Final     THC Screen, Urine   Date Value Ref Range Status   06/12/2023 neg  Final     Tricyclic Antidepressants, Urine   Date Value Ref Range Status   06/12/2023 neg  Final       Last Radha Cleverly: 6-12-23  Medication Contract: 6-12-23   Last Fill: 6-16-23    Requested Prescriptions     Pending Prescriptions Disp Refills    amphetamine-dextroamphetamine (ADDERALL XR) 25 MG extended release capsule 30 capsule 0     Sig: Take 1 capsule by mouth every morning for 30 days. Max Daily Amount: 25 mg         Please approve or refuse this medication.    Ophelia Maldonado MA

## 2023-08-10 DIAGNOSIS — F98.8 ATTENTION DEFICIT DISORDER (ADD) WITHOUT HYPERACTIVITY: ICD-10-CM

## 2023-08-11 RX ORDER — DEXTROAMPHETAMINE SACCHARATE, AMPHETAMINE ASPARTATE MONOHYDRATE, DEXTROAMPHETAMINE SULFATE AND AMPHETAMINE SULFATE 6.25; 6.25; 6.25; 6.25 MG/1; MG/1; MG/1; MG/1
25 CAPSULE, EXTENDED RELEASE ORAL EVERY MORNING
Qty: 30 CAPSULE | Refills: 0 | Status: SHIPPED | OUTPATIENT
Start: 2023-08-11 | End: 2023-09-10

## 2023-09-11 DIAGNOSIS — F98.8 ATTENTION DEFICIT DISORDER (ADD) WITHOUT HYPERACTIVITY: ICD-10-CM

## 2023-09-11 RX ORDER — DEXTROAMPHETAMINE SACCHARATE, AMPHETAMINE ASPARTATE MONOHYDRATE, DEXTROAMPHETAMINE SULFATE AND AMPHETAMINE SULFATE 6.25; 6.25; 6.25; 6.25 MG/1; MG/1; MG/1; MG/1
25 CAPSULE, EXTENDED RELEASE ORAL EVERY MORNING
Qty: 30 CAPSULE | Refills: 0 | Status: SHIPPED | OUTPATIENT
Start: 2023-09-11 | End: 2023-10-11

## 2023-09-11 NOTE — TELEPHONE ENCOUNTER
Mari Yoon called to request a refill on his medication. Last office visit : 6/12/2023   Next office visit : 9/19/2023     Last UDS:   Amphetamine Screen, Urine   Date Value Ref Range Status   06/12/2023 neg  Final     Barbiturate Screen, Urine   Date Value Ref Range Status   06/12/2023 neg  Final     Benzodiazepine Screen, Urine   Date Value Ref Range Status   06/12/2023 neg  Final     Buprenorphine Urine   Date Value Ref Range Status   06/12/2023 neg  Final     Cocaine Metabolite Screen, Urine   Date Value Ref Range Status   06/12/2023 neg  Final     Gabapentin Screen, Urine   Date Value Ref Range Status   06/12/2023 neg  Final     MDMA, Urine   Date Value Ref Range Status   06/12/2023 neg  Final     Methamphetamine, Urine   Date Value Ref Range Status   06/12/2023 neg  Final     Opiate Scrn, Ur   Date Value Ref Range Status   06/12/2023 neg  Final     Oxycodone Screen, Ur   Date Value Ref Range Status   06/12/2023 neg  Final     PCP Screen, Urine   Date Value Ref Range Status   06/12/2023 neg  Final     Propoxyphene Screen, Urine   Date Value Ref Range Status   06/12/2023 neg  Final     THC Screen, Urine   Date Value Ref Range Status   06/12/2023 neg  Final     Tricyclic Antidepressants, Urine   Date Value Ref Range Status   06/12/2023 neg  Final       Last Bernell Co: 6/12/23  Medication Contract: 3/3/22   Last Fill: 8/11/23    Requested Prescriptions     Pending Prescriptions Disp Refills    amphetamine-dextroamphetamine (ADDERALL XR) 25 MG extended release capsule 30 capsule 0     Sig: Take 1 capsule by mouth every morning for 30 days. Max Daily Amount: 25 mg         Please approve or refuse this medication.    Lisy Pond, [facility]

## 2023-09-19 ENCOUNTER — OFFICE VISIT (OUTPATIENT)
Dept: PRIMARY CARE CLINIC | Age: 38
End: 2023-09-19
Payer: COMMERCIAL

## 2023-09-19 DIAGNOSIS — F32.4 MAJOR DEPRESSIVE DISORDER WITH SINGLE EPISODE, IN PARTIAL REMISSION (HCC): ICD-10-CM

## 2023-09-19 DIAGNOSIS — F98.8 ATTENTION DEFICIT DISORDER (ADD) WITHOUT HYPERACTIVITY: Primary | ICD-10-CM

## 2023-09-19 DIAGNOSIS — Z51.81 THERAPEUTIC DRUG MONITORING: ICD-10-CM

## 2023-09-19 PROCEDURE — 99214 OFFICE O/P EST MOD 30 MIN: CPT | Performed by: NURSE PRACTITIONER

## 2023-09-19 RX ORDER — ATOMOXETINE 18 MG/1
18 CAPSULE ORAL DAILY
Qty: 30 CAPSULE | Refills: 0 | Status: SHIPPED | OUTPATIENT
Start: 2023-09-19

## 2023-09-19 ASSESSMENT — PATIENT HEALTH QUESTIONNAIRE - PHQ9
SUM OF ALL RESPONSES TO PHQ QUESTIONS 1-9: 0
SUM OF ALL RESPONSES TO PHQ9 QUESTIONS 1 & 2: 0
SUM OF ALL RESPONSES TO PHQ QUESTIONS 1-9: 0
SUM OF ALL RESPONSES TO PHQ QUESTIONS 1-9: 0
1. LITTLE INTEREST OR PLEASURE IN DOING THINGS: 0
2. FEELING DOWN, DEPRESSED OR HOPELESS: 0
SUM OF ALL RESPONSES TO PHQ QUESTIONS 1-9: 0

## 2023-09-19 ASSESSMENT — ENCOUNTER SYMPTOMS
ABDOMINAL PAIN: 0
SHORTNESS OF BREATH: 0
COLOR CHANGE: 0
COUGH: 0
DIARRHEA: 0
VOMITING: 0
NAUSEA: 0
SORE THROAT: 0
CHEST TIGHTNESS: 0

## 2023-09-19 NOTE — PROGRESS NOTES
[address], [facility]     Phone:  [phone]  Fax:  [phone]      Marlon Hough is a 41 y.o. male who presents today for his medical conditions/complaints as noted below. Marlon Hough is c/o of 3 Month Follow-Up, Depression, and ADHD      Chief Complaint   Patient presents with    3 Month Follow-Up    Depression    ADHD       HPI:     HPI     Patient presents for 3 month follow up for depression and ADHD. Patient reports his depression is well-controlled with Paxil and Wellbutrin. Patient wants to discuss switching to a non-controlled medication for his ADHD. He states he was diagnosed at 16years old and has tried lots of different medications but is unsure how these were for him. Past Medical History:   Diagnosis Date    Asthma     as a child    Enlarged prostate     Hemorrhoid     Kidney stones     Rectal bleed         Past Surgical History:   Procedure Laterality Date    COLONOSCOPY N/A 05/25/2016    Dr Ame Pack internal hemorrhoids, HP x 3, 5 yr recall    COLONOSCOPY N/A 04/13/2023    Dr MAX Armstrong-Lymphocytic colitis, HP x 3    MOUTH SURGERY      Tooth extraction    UPPER GASTROINTESTINAL ENDOSCOPY N/A 04/13/2023    Dr Mir Pate (+) [facility] w/LGD, nodular gastritis, 6 month recall    WISDOM TOOTH EXTRACTION         Social History     Tobacco Use    Smoking status: Some Days     Packs/day: .5     Types: Cigarettes    Smokeless tobacco: Never   Substance Use Topics    Alcohol use: Yes     Alcohol/week: 3.0 - 4.0 standard drinks of alcohol     Types: 3 - 4 Cans of beer per week     Comment: occassionally        Current Outpatient Medications   Medication Sig Dispense Refill    atomoxetine (STRATTERA) 18 MG capsule Take 1 capsule by mouth daily 30 capsule 0    amphetamine-dextroamphetamine (ADDERALL XR) 25 MG extended release capsule Take 1 capsule by mouth every morning for 30 days.  Max Daily Amount: 25 mg 30 capsule 0    pantoprazole (PROTONIX) 40 MG

## 2023-10-18 DIAGNOSIS — F98.8 ATTENTION DEFICIT DISORDER (ADD) WITHOUT HYPERACTIVITY: ICD-10-CM

## 2023-10-18 RX ORDER — DEXTROAMPHETAMINE SACCHARATE, AMPHETAMINE ASPARTATE MONOHYDRATE, DEXTROAMPHETAMINE SULFATE AND AMPHETAMINE SULFATE 6.25; 6.25; 6.25; 6.25 MG/1; MG/1; MG/1; MG/1
1 CAPSULE, EXTENDED RELEASE ORAL EVERY MORNING
Qty: 30 CAPSULE | Refills: 0 | Status: SHIPPED | OUTPATIENT
Start: 2023-10-18 | End: 2023-11-17

## 2023-10-18 NOTE — TELEPHONE ENCOUNTER
Estela Stager called to request a refill on his medication. Last office visit : 9/19/2023   Next office visit : 10/23/2023     Last UDS:   Amphetamine Screen, Urine   Date Value Ref Range Status   06/12/2023 neg  Final     Barbiturate Screen, Urine   Date Value Ref Range Status   06/12/2023 neg  Final     Benzodiazepine Screen, Urine   Date Value Ref Range Status   06/12/2023 neg  Final     Buprenorphine Urine   Date Value Ref Range Status   06/12/2023 neg  Final     Cocaine Metabolite Screen, Urine   Date Value Ref Range Status   06/12/2023 neg  Final     Gabapentin Screen, Urine   Date Value Ref Range Status   06/12/2023 neg  Final     MDMA, Urine   Date Value Ref Range Status   06/12/2023 neg  Final     Methamphetamine, Urine   Date Value Ref Range Status   06/12/2023 neg  Final     Opiate Scrn, Ur   Date Value Ref Range Status   06/12/2023 neg  Final     Oxycodone Screen, Ur   Date Value Ref Range Status   06/12/2023 neg  Final     PCP Screen, Urine   Date Value Ref Range Status   06/12/2023 neg  Final     Propoxyphene Screen, Urine   Date Value Ref Range Status   06/12/2023 neg  Final     THC Screen, Urine   Date Value Ref Range Status   06/12/2023 neg  Final     Tricyclic Antidepressants, Urine   Date Value Ref Range Status   06/12/2023 neg  Final       Last Venita Desirrich: 10/18/23  Medication Contract: 3/3/22   Last Fill: 9/11/23    Requested Prescriptions     Pending Prescriptions Disp Refills    amphetamine-dextroamphetamine (ADDERALL XR) 25 MG extended release capsule [Pharmacy Med Name: DEXTROAMP-AMPHET ER 25 MG C 25 Capsule] 30 capsule 0     Sig: Take 1 capsule by mouth every morning. Please approve or refuse this medication.    Isabel Fulton [place]

## 2023-10-23 ENCOUNTER — OFFICE VISIT (OUTPATIENT)
Dept: PRIMARY CARE CLINIC | Age: 38
End: 2023-10-23
Payer: COMMERCIAL

## 2023-10-23 VITALS
DIASTOLIC BLOOD PRESSURE: 80 MMHG | HEART RATE: 86 BPM | BODY MASS INDEX: 27.44 KG/M2 | TEMPERATURE: 97.4 F | HEIGHT: 67 IN | SYSTOLIC BLOOD PRESSURE: 118 MMHG | OXYGEN SATURATION: 97 % | WEIGHT: 174.8 LBS

## 2023-10-23 DIAGNOSIS — F98.8 ATTENTION DEFICIT DISORDER (ADD) WITHOUT HYPERACTIVITY: Primary | ICD-10-CM

## 2023-10-23 DIAGNOSIS — Z23 FLU VACCINE NEED: ICD-10-CM

## 2023-10-23 DIAGNOSIS — Z51.81 THERAPEUTIC DRUG MONITORING: ICD-10-CM

## 2023-10-23 PROCEDURE — 90674 CCIIV4 VAC NO PRSV 0.5 ML IM: CPT | Performed by: NURSE PRACTITIONER

## 2023-10-23 PROCEDURE — 99214 OFFICE O/P EST MOD 30 MIN: CPT | Performed by: NURSE PRACTITIONER

## 2023-10-23 PROCEDURE — 90471 IMMUNIZATION ADMIN: CPT | Performed by: NURSE PRACTITIONER

## 2023-10-23 ASSESSMENT — ENCOUNTER SYMPTOMS
SORE THROAT: 0
ABDOMINAL PAIN: 0
NAUSEA: 0
COUGH: 0
CHEST TIGHTNESS: 0
COLOR CHANGE: 0
VOMITING: 0
DIARRHEA: 0
SHORTNESS OF BREATH: 0
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
## 2023-11-14 DIAGNOSIS — F98.8 ATTENTION DEFICIT DISORDER (ADD) WITHOUT HYPERACTIVITY: ICD-10-CM

## 2023-11-14 NOTE — TELEPHONE ENCOUNTER
Natasha Cortez called to request a refill on his medication. Last office visit : 10/23/2023   Next office visit : 12/4/2023     Last UDS:   Amphetamine Screen, Urine   Date Value Ref Range Status   10/23/2023 Positive Cutoff 300 ng/mL Final     Comment:     If the screen is positive, then confirmation testing by mass  spectrometry  will be added. Additional charges will apply. Barbiturate Screen, Urine   Date Value Ref Range Status   10/23/2023 neg  Final     Benzodiazepine Screen, Urine   Date Value Ref Range Status   10/23/2023 Negative Cutoff 200 ng/mL Final     Buprenorphine Urine   Date Value Ref Range Status   10/23/2023 neg  Final     Cocaine Metabolite Screen, Urine   Date Value Ref Range Status   10/23/2023 Negative Cutoff 150 ng/mL Final     Gabapentin Screen, Urine   Date Value Ref Range Status   10/23/2023 neg  Final     MDMA, Urine   Date Value Ref Range Status   10/23/2023 neg  Final     Methamphetamine, Urine   Date Value Ref Range Status   10/23/2023 <200 ng/mL Final     Opiate Scrn, Ur   Date Value Ref Range Status   10/23/2023 Negative Cutoff 300 ng/mL Final     Oxycodone Screen, Ur   Date Value Ref Range Status   10/23/2023 Negative Cutoff 100 ng/mL Final     PCP Screen, Urine   Date Value Ref Range Status   10/23/2023 Negative Cutoff 25 ng/mL Final     Propoxyphene Screen, Urine   Date Value Ref Range Status   10/23/2023 neg  Final     THC Screen, Urine   Date Value Ref Range Status   10/23/2023 neg  Final     Tricyclic Antidepressants, Urine   Date Value Ref Range Status   10/23/2023 neg  Final       Last Odette Slate: 10/18/2023  Medication Contract: 2/7/2023   Last Fill: 10/18/2023    Requested Prescriptions     Pending Prescriptions Disp Refills    amphetamine-dextroamphetamine (ADDERALL XR) 25 MG extended release capsule 30 capsule 0     Sig: Take 1 capsule by mouth every morning for 30 days.  Max Daily Amount: 1 capsule                               Please approve or refuse this

## 2023-11-15 RX ORDER — DEXTROAMPHETAMINE SACCHARATE, AMPHETAMINE ASPARTATE MONOHYDRATE, DEXTROAMPHETAMINE SULFATE AND AMPHETAMINE SULFATE 6.25; 6.25; 6.25; 6.25 MG/1; MG/1; MG/1; MG/1
1 CAPSULE, EXTENDED RELEASE ORAL EVERY MORNING
Qty: 30 CAPSULE | Refills: 0 | Status: SHIPPED | OUTPATIENT
Start: 2023-11-17 | End: 2023-12-17

## 2023-11-17 ENCOUNTER — TELEPHONE (OUTPATIENT)
Dept: PRIMARY CARE CLINIC | Age: 38
End: 2023-11-17

## 2023-11-17 NOTE — TELEPHONE ENCOUNTER
Pt stated he had been left a VM about his medication and I let him know it was sent in to [address] [facility]

## 2023-12-26 DIAGNOSIS — F98.8 ATTENTION DEFICIT DISORDER (ADD) WITHOUT HYPERACTIVITY: ICD-10-CM

## 2023-12-26 RX ORDER — DEXTROAMPHETAMINE SACCHARATE, AMPHETAMINE ASPARTATE MONOHYDRATE, DEXTROAMPHETAMINE SULFATE AND AMPHETAMINE SULFATE 6.25; 6.25; 6.25; 6.25 MG/1; MG/1; MG/1; MG/1
1 CAPSULE, EXTENDED RELEASE ORAL EVERY MORNING
Qty: 30 CAPSULE | Refills: 0 | Status: SHIPPED | OUTPATIENT
Start: 2023-12-26 | End: 2024-01-25

## 2024-01-31 DIAGNOSIS — F98.8 ATTENTION DEFICIT DISORDER (ADD) WITHOUT HYPERACTIVITY: ICD-10-CM

## 2024-02-05 RX ORDER — DEXTROAMPHETAMINE SACCHARATE, AMPHETAMINE ASPARTATE MONOHYDRATE, DEXTROAMPHETAMINE SULFATE AND AMPHETAMINE SULFATE 6.25; 6.25; 6.25; 6.25 MG/1; MG/1; MG/1; MG/1
1 CAPSULE, EXTENDED RELEASE ORAL EVERY MORNING
Qty: 30 CAPSULE | Refills: 0 | Status: SHIPPED | OUTPATIENT
Start: 2024-02-05 | End: 2024-03-06

## 2024-03-07 DIAGNOSIS — F98.8 ATTENTION DEFICIT DISORDER (ADD) WITHOUT HYPERACTIVITY: ICD-10-CM

## 2024-03-08 RX ORDER — DEXTROAMPHETAMINE SACCHARATE, AMPHETAMINE ASPARTATE MONOHYDRATE, DEXTROAMPHETAMINE SULFATE AND AMPHETAMINE SULFATE 6.25; 6.25; 6.25; 6.25 MG/1; MG/1; MG/1; MG/1
1 CAPSULE, EXTENDED RELEASE ORAL EVERY MORNING
Qty: 30 CAPSULE | Refills: 0 | Status: SHIPPED | OUTPATIENT
Start: 2024-03-08 | End: 2024-04-07

## 2024-03-08 NOTE — TELEPHONE ENCOUNTER
Benjamin KING Ray called to request a refill on his medication.      Last office visit : 10/23/2023   Next office visit : Visit date not found     Last UDS:   Amphetamine Screen, Urine   Date Value Ref Range Status   10/23/2023 Positive Cutoff 300 ng/mL Final     Comment:     If the screen is positive, then confirmation testing by mass  spectrometry  will be added. Additional charges will apply.       Barbiturate Screen, Urine   Date Value Ref Range Status   10/23/2023 neg  Final     Benzodiazepine Screen, Urine   Date Value Ref Range Status   10/23/2023 Negative Cutoff 200 ng/mL Final     Buprenorphine Urine   Date Value Ref Range Status   10/23/2023 neg  Final     Cocaine Metabolite Screen, Urine   Date Value Ref Range Status   10/23/2023 Negative Cutoff 150 ng/mL Final     Gabapentin Screen, Urine   Date Value Ref Range Status   10/23/2023 neg  Final     MDMA, Urine   Date Value Ref Range Status   10/23/2023 neg  Final     Methamphetamine, Urine   Date Value Ref Range Status   10/23/2023 <200 ng/mL Final     Opiate Scrn, Ur   Date Value Ref Range Status   10/23/2023 Negative Cutoff 300 ng/mL Final     Oxycodone Screen, Ur   Date Value Ref Range Status   10/23/2023 Negative Cutoff 100 ng/mL Final     PCP Screen, Urine   Date Value Ref Range Status   10/23/2023 Negative Cutoff 25 ng/mL Final     Propoxyphene Screen, Urine   Date Value Ref Range Status   10/23/2023 neg  Final     THC Screen, Urine   Date Value Ref Range Status   10/23/2023 neg  Final     Tricyclic Antidepressants, Urine   Date Value Ref Range Status   10/23/2023 neg  Final       Last Misael: 10/18/23  Medication Contract: 3/3/22   Last Fill: 2/5/24    Requested Prescriptions     Pending Prescriptions Disp Refills    amphetamine-dextroamphetamine (ADDERALL XR) 25 MG extended release capsule 30 capsule 0     Sig: Take 1 capsule by mouth every morning for 30 days. Max Daily Amount: 1 capsule         Please approve or refuse this medication.

## 2024-04-16 DIAGNOSIS — F98.8 ATTENTION DEFICIT DISORDER (ADD) WITHOUT HYPERACTIVITY: ICD-10-CM

## 2024-04-16 DIAGNOSIS — J30.1 ALLERGIC RHINITIS DUE TO POLLEN, UNSPECIFIED SEASONALITY: ICD-10-CM

## 2024-04-16 DIAGNOSIS — F32.4 MAJOR DEPRESSIVE DISORDER WITH SINGLE EPISODE, IN PARTIAL REMISSION (HCC): ICD-10-CM

## 2024-04-16 RX ORDER — FEXOFENADINE HCL 180 MG/1
180 TABLET ORAL DAILY
Qty: 30 TABLET | Refills: 0 | Status: SHIPPED | OUTPATIENT
Start: 2024-04-16

## 2024-04-16 RX ORDER — DEXTROAMPHETAMINE SACCHARATE, AMPHETAMINE ASPARTATE MONOHYDRATE, DEXTROAMPHETAMINE SULFATE AND AMPHETAMINE SULFATE 6.25; 6.25; 6.25; 6.25 MG/1; MG/1; MG/1; MG/1
1 CAPSULE, EXTENDED RELEASE ORAL EVERY MORNING
Qty: 30 CAPSULE | Refills: 0 | OUTPATIENT
Start: 2024-04-16 | End: 2024-05-16

## 2024-04-16 RX ORDER — BUPROPION HYDROCHLORIDE 150 MG/1
150 TABLET ORAL EVERY MORNING
Qty: 30 TABLET | Refills: 0 | Status: SHIPPED | OUTPATIENT
Start: 2024-04-16

## 2024-04-16 RX ORDER — PAROXETINE HYDROCHLORIDE 20 MG/1
20 TABLET, FILM COATED ORAL EVERY MORNING
Qty: 30 TABLET | Refills: 0 | Status: SHIPPED | OUTPATIENT
Start: 2024-04-16 | End: 2024-05-16

## 2024-04-17 NOTE — TELEPHONE ENCOUNTER
Patient has not been seen in office since 10/2023 and does not have appointment scheduled with Georgina for controlled med refill. Patient needs appointment for refill.

## (undated) DEVICE — SINGLE PORT MANIFOLD: Brand: NEPTUNE 2

## (undated) DEVICE — BITE BLOCK ENDOSCP AD 60 FR W/ ADJ STRP PLAS GRN BLOX

## (undated) DEVICE — FORCEPS BX 240CM 2.4MM L NDL RAD JAW 4 M00513334

## (undated) DEVICE — ENDO KIT,LOURDES HOSPITAL: Brand: MEDLINE INDUSTRIES, INC.

## (undated) DEVICE — CANNULA NSL AD L7FT DIV O2 CO2 W/ M LUERLOCK TRMPT CONN

## (undated) DEVICE — ENDO KIT: Brand: MEDLINE INDUSTRIES, INC.